# Patient Record
Sex: FEMALE | Race: WHITE | NOT HISPANIC OR LATINO | Employment: FULL TIME | URBAN - METROPOLITAN AREA
[De-identification: names, ages, dates, MRNs, and addresses within clinical notes are randomized per-mention and may not be internally consistent; named-entity substitution may affect disease eponyms.]

---

## 2018-01-18 ENCOUNTER — OFFICE VISIT (OUTPATIENT)
Dept: FAMILY MEDICINE CLINIC | Facility: CLINIC | Age: 28
End: 2018-01-18

## 2018-01-18 VITALS
HEART RATE: 71 BPM | HEIGHT: 59 IN | OXYGEN SATURATION: 98 % | WEIGHT: 253 LBS | TEMPERATURE: 98.8 F | DIASTOLIC BLOOD PRESSURE: 60 MMHG | SYSTOLIC BLOOD PRESSURE: 98 MMHG | BODY MASS INDEX: 51 KG/M2

## 2018-01-18 DIAGNOSIS — N97.9 INFERTILITY, FEMALE: ICD-10-CM

## 2018-01-18 DIAGNOSIS — Z00.00 HEALTH CARE MAINTENANCE: ICD-10-CM

## 2018-01-18 DIAGNOSIS — L70.9 ACNE, UNSPECIFIED ACNE TYPE: ICD-10-CM

## 2018-01-18 DIAGNOSIS — N93.9 ABNORMAL UTERINE BLEEDING: Primary | ICD-10-CM

## 2018-01-18 DIAGNOSIS — N92.1 MENORRHAGIA WITH IRREGULAR CYCLE: ICD-10-CM

## 2018-01-18 DIAGNOSIS — L68.9 EXCESSIVE HAIR GROWTH: ICD-10-CM

## 2018-01-18 LAB
HCT VFR BLDA CALC: 42 %
HGB BLDA-MCNC: 13.7 G/DL
MCH, POC: 27.4
MCHC, POC: 32.7
MCV, POC: 83.9
PLATELET # BLD AUTO: 317 10*3/MM3
PMV BLD: 8.1 FL
RBC, POC: 5
RDW, POC: 14
WBC # BLD: 10.5 10*3/UL

## 2018-01-18 PROCEDURE — 85027 COMPLETE CBC AUTOMATED: CPT | Performed by: FAMILY MEDICINE

## 2018-01-18 PROCEDURE — 99203 OFFICE O/P NEW LOW 30 MIN: CPT | Performed by: FAMILY MEDICINE

## 2018-01-18 NOTE — PROGRESS NOTES
Subjective   Jose Alfredo Salcedo is a 27 y.o. female. Presents today for   Chief Complaint   Patient presents with   • Establish Care     pt is here to establish care. she wants to know if she can get pregnant, having trouble conceiving.     New patient here to establish care.  Menstrual Problem   This is a chronic problem. The current episode started more than 1 year ago. The problem occurs constantly. The problem has been unchanged. Pertinent negatives include no abdominal pain, change in bowel habit, chest pain, chills, fatigue, fever, nausea, numbness, rash, sore throat, swollen glands, visual change, vomiting or weakness. Associated symptoms comments: Menarache 12 yoa;  Always has been abnormal.  Is very irregular, can skip a couple of months, then flow for 10 days.  LMP mid-December, reports this was abnormal as very light flow.  Occly heavy flow and other times spotting.  Has not had labs or evaluation, just tried OCP.    Has been trying conceive for past 12 months.   Reports partner has narrow urethra, but not certain on sperm testing or not.    Last pap smear >3 years ago;   Reports last primary was monitoring cysts on right breast.  Denies masses or lumps now;  No nipple d/c.  No skin changes or rashes.    Reports had urinary incontinence saw urology and gyn for this, was NINO;  Sent to PT and resolved with kegel exercises.    Has unwanted acne, hair loss (but thinks normal).  Has also unwanted hair growth.   No mood changes.. Nothing aggravates the symptoms. Treatments tried: Has tried OCP in past for menstrual cycle, currently trying to conceive. The treatment provided no relief.       Review of Systems   Constitutional: Negative for chills, fatigue and fever.   HENT: Negative for sore throat.    Eyes: Negative for visual disturbance.   Respiratory: Negative for shortness of breath and wheezing.    Cardiovascular: Negative for chest pain.   Gastrointestinal: Negative for abdominal pain, change in bowel habit,  "nausea and vomiting.   Genitourinary: Positive for menstrual problem.   Skin: Negative for pallor and rash.   Neurological: Negative for dizziness, syncope, weakness, light-headedness and numbness.       The following portions of the patient's history were reviewed and updated as appropriate: allergies, current medications, past family history, past medical history, past social history, past surgical history and problem list.    There is no problem list on file for this patient.      Allergies   Allergen Reactions   • Aspirin Hives and Swelling       No current outpatient prescriptions on file prior to visit.     No current facility-administered medications on file prior to visit.      Past Medical History:   Diagnosis Date   • Asthma    • Headache    • Kidney stone    • Low back pain      History reviewed. No pertinent surgical history.  Family History   Problem Relation Age of Onset   • Diabetes Mother    • Heart disease Mother    • Hypertension Mother    • Bipolar disorder Mother    • Diabetes Maternal Grandmother      Social History     Social History   • Marital status:      Spouse name: N/A   • Number of children: N/A   • Years of education: N/A     Social History Main Topics   • Smoking status: Never Smoker   • Smokeless tobacco: Never Used   • Alcohol use No   • Drug use: No   • Sexual activity: Not Asked     Other Topics Concern   • None     Social History Narrative   • None     OB History      Para Term  AB Living    0 0 0 0 0 0    SAB TAB Ectopic Multiple Live Births    0 0 0 0 0        Obstetric Comments    Would like to conceive          Objective   Vitals:    18 0927   BP: 98/60   BP Location: Left arm   Patient Position: Sitting   Cuff Size: Large Adult   Pulse: 71   Temp: 98.8 °F (37.1 °C)   TempSrc: Oral   SpO2: 98%   Weight: 115 kg (253 lb)   Height: 149.9 cm (59\")       Physical Exam   Constitutional: She appears well-developed and well-nourished.   HENT:   Head: " Normocephalic and atraumatic.   Neck: Neck supple. No JVD present. No thyromegaly present.   Cardiovascular: Normal rate, regular rhythm and normal heart sounds.  Exam reveals no gallop and no friction rub.    No murmur heard.  Pulmonary/Chest: Effort normal and breath sounds normal. No respiratory distress. She has no wheezes. She has no rales.   Abdominal: Soft. Bowel sounds are normal. She exhibits no distension. There is no tenderness. There is no rebound and no guarding.   Musculoskeletal: She exhibits no edema.   Neurological: She is alert.   Skin: Skin is warm and dry.   Facial acne mild noted   Psychiatric: She has a normal mood and affect. Her behavior is normal.   Nursing note and vitals reviewed.    CBC ordered and reviewed.     Ref Range & Units 6d ago     Hematocrit % 42   Hemoglobin g/dL 13.7   RBC  5   WBC  10.5   MCV  83.9   MCH  27.4   MCHC  32.7   RDW-CV  14   Platelets 10*3/mm3 317   MPV  8.1   Resulting Agency  [facility]      Specimen Collected: 01/18/18 11:34 AM   Last Resulted: 01/18/18 11:34 AM          Assessment/Plan   Jose Alfredo was seen today for establish care.    Diagnoses and all orders for this visit:    Abnormal uterine bleeding  -     Comprehensive Metabolic Panel  -     DHEA-Sulfate  -     POC CBC  -     Testosterone, Free, Total  -     TSH  -     FSH & LH  -     Insulin, Total  -     Lipid Panel  -     US pelvis complete  -     Ambulatory Referral to Gynecology    Menorrhagia with irregular cycle  -     Comprehensive Metabolic Panel  -     DHEA-Sulfate  -     POC CBC  -     Testosterone, Free, Total  -     TSH  -     FSH & LH  -     Insulin, Total  -     Lipid Panel  -     US pelvis complete  -     Ambulatory Referral to Gynecology    Infertility, female  -     Comprehensive Metabolic Panel  -     DHEA-Sulfate  -     POC CBC  -     Testosterone, Free, Total  -     TSH  -     FSH & LH  -     Insulin, Total  -     Lipid Panel  -     US pelvis complete  -      Ambulatory Referral to Gynecology    Health care maintenance  -     Comprehensive Metabolic Panel  -     DHEA-Sulfate  -     POC CBC  -     Testosterone, Free, Total  -     TSH  -     FSH & LH  -     Insulin, Total  -     Lipid Panel  -     US pelvis complete  -     Ambulatory Referral to Gynecology    -patient has been unable to conceive over past 12 months;  Will check labs;  Also lifetime irregular periods with ? Anovulatory bleeding.   I discussed with PCOS as possible reason.  Will check labs, u/s and refer to gynecology.  -recommend up date pap, will see gyn  -d/w pcos basic tx options if dx, will await work-up  -encouraged come with partners appt to discuss testing for him as well         -Follow up: 2 months     No current outpatient prescriptions on file.

## 2018-01-21 LAB
ALBUMIN SERPL-MCNC: 4.3 G/DL (ref 3.5–5.2)
ALBUMIN/GLOB SERPL: 1.1 G/DL
ALP SERPL-CCNC: 84 U/L (ref 39–117)
ALT SERPL-CCNC: 23 U/L (ref 1–33)
AST SERPL-CCNC: 21 U/L (ref 1–32)
BILIRUB SERPL-MCNC: 0.4 MG/DL (ref 0.1–1.2)
BUN SERPL-MCNC: 10 MG/DL (ref 6–20)
BUN/CREAT SERPL: 11.1 (ref 7–25)
CALCIUM SERPL-MCNC: 9.8 MG/DL (ref 8.6–10.5)
CHLORIDE SERPL-SCNC: 101 MMOL/L (ref 98–107)
CHOLEST SERPL-MCNC: 178 MG/DL (ref 0–200)
CO2 SERPL-SCNC: 20.8 MMOL/L (ref 22–29)
CREAT SERPL-MCNC: 0.9 MG/DL (ref 0.57–1)
DHEA-S SERPL-MCNC: 322 UG/DL (ref 84.8–378)
FSH SERPL-ACNC: 1.9 MIU/ML
GLOBULIN SER CALC-MCNC: 3.8 GM/DL
GLUCOSE SERPL-MCNC: 73 MG/DL (ref 65–99)
HDLC SERPL-MCNC: 37 MG/DL (ref 40–60)
INSULIN SERPL-ACNC: 12.8 UIU/ML (ref 2.6–24.9)
LDLC SERPL CALC-MCNC: 125 MG/DL (ref 0–100)
LH SERPL-ACNC: 6.7 MIU/ML
POTASSIUM SERPL-SCNC: 4.4 MMOL/L (ref 3.5–5.2)
PROT SERPL-MCNC: 8.1 G/DL (ref 6–8.5)
SODIUM SERPL-SCNC: 139 MMOL/L (ref 136–145)
TESTOST FREE SERPL-MCNC: 3 PG/ML (ref 0–4.2)
TESTOST SERPL-MCNC: 51 NG/DL (ref 8–48)
TRIGL SERPL-MCNC: 81 MG/DL (ref 0–150)
TSH SERPL DL<=0.005 MIU/L-ACNC: 2.15 MIU/ML (ref 0.27–4.2)
VLDLC SERPL CALC-MCNC: 16.2 MG/DL (ref 5–40)

## 2018-01-24 DIAGNOSIS — E28.2 PCOS (POLYCYSTIC OVARIAN SYNDROME): ICD-10-CM

## 2018-01-24 DIAGNOSIS — E28.8 HYPERANDROGENISM: ICD-10-CM

## 2018-01-24 DIAGNOSIS — L68.0 FAMILIAL HIRSUTISM: Primary | ICD-10-CM

## 2018-01-24 NOTE — PROGRESS NOTES
Call and mail copy of results to patient.  Insulin level and blood sugar is normal  Thyroid okay  Lipids mildly elevated  Testosterone mildly elevated, wonder if PCOS.  Since patient desires fertility, I recommend we send to endocrinology.  I placed referral.  I also recommend weight loss, it has been shown 5 to 10% of loss of current body weight can improve fertility if does have pcos.

## 2018-02-17 ENCOUNTER — APPOINTMENT (OUTPATIENT)
Dept: ULTRASOUND IMAGING | Facility: HOSPITAL | Age: 28
End: 2018-02-17

## 2019-03-27 ENCOUNTER — OFFICE VISIT (OUTPATIENT)
Dept: FAMILY MEDICINE CLINIC | Facility: CLINIC | Age: 29
End: 2019-03-27

## 2019-03-27 VITALS
HEIGHT: 62 IN | OXYGEN SATURATION: 96 % | TEMPERATURE: 98.1 F | HEART RATE: 93 BPM | BODY MASS INDEX: 51.34 KG/M2 | WEIGHT: 279 LBS | SYSTOLIC BLOOD PRESSURE: 112 MMHG | DIASTOLIC BLOOD PRESSURE: 60 MMHG

## 2019-03-27 DIAGNOSIS — J45.990 ASTHMA, EXERCISE INDUCED: ICD-10-CM

## 2019-03-27 DIAGNOSIS — K21.00 GASTROESOPHAGEAL REFLUX DISEASE WITH ESOPHAGITIS: Primary | ICD-10-CM

## 2019-03-27 DIAGNOSIS — K52.9 GASTROENTERITIS: ICD-10-CM

## 2019-03-27 PROBLEM — J45.22 MILD INTERMITTENT ASTHMA WITH STATUS ASTHMATICUS: Status: ACTIVE | Noted: 2019-03-27

## 2019-03-27 PROCEDURE — 99214 OFFICE O/P EST MOD 30 MIN: CPT | Performed by: NURSE PRACTITIONER

## 2019-03-27 RX ORDER — OMEPRAZOLE 20 MG/1
20 CAPSULE, DELAYED RELEASE ORAL DAILY
Qty: 30 CAPSULE | Refills: 1 | Status: SHIPPED | OUTPATIENT
Start: 2019-03-27 | End: 2019-04-26

## 2019-03-27 RX ORDER — ALBUTEROL SULFATE 90 UG/1
2 AEROSOL, METERED RESPIRATORY (INHALATION) EVERY 4 HOURS PRN
Qty: 6.7 G | Refills: 11 | Status: SHIPPED | OUTPATIENT
Start: 2019-03-27 | End: 2020-01-29 | Stop reason: ALTCHOICE

## 2019-03-27 NOTE — ASSESSMENT & PLAN NOTE
Asthma is mores symptomatic .  The patient is experiencing mostly exercise induced . She is experiencing no nighttime asthma symptoms.  Personalized, written asthma management plan given.  Asthma information handout given.  Discussed monitoring symptoms and use of quick-relief medications and contacting us early in the course of exacerbations.  Warning signs of respiratory distress were reviewed with the patient.   Reduce exposure to inhaled allergens: wash bedding weekly in water > 130'F to kill dust mites, vacuum 2x/week (the patient should not do the vacuuming), keep pets out of bedroom with bedroom door closed, keep pets off furniture and wash pet weekly.

## 2019-03-27 NOTE — PROGRESS NOTES
[facility]     Jose Alfredo GLASS is a 29 y.o. female who presents with   Chief Complaint   Patient presents with   • Abdominal Pain     burning   • Diarrhea   • Vomiting   • Asthma     wants inhaler       After eating alot of acidic foods this weekend patient experienced some abdominal pain,gerd, nausea, vomiting and diarrhea. Has exspereinced Gerd in he past but not to this extreme. Abdominal pain still present nausea, vomiting and diarrhea are all resolved.  LMP: unknown  GERD: worsening   Asthma : worsening. Has not had any acute exacerbation of Asthma. But has noticed shortness of breath while walking up stairs or any physical activity.      Abdominal Pain   This is a new problem. The current episode started yesterday. The onset quality is sudden. The problem occurs constantly. The problem has been gradually improving. The pain is located in the epigastric region. The quality of the pain is burning. The abdominal pain radiates to the epigastric region. Associated symptoms include diarrhea, nausea and vomiting. Pertinent negatives include no fever. She has tried nothing for the symptoms.   Diarrhea    This is a new problem. The current episode started yesterday. The problem occurs less than 2 times per day. The problem has been resolved. Associated symptoms include abdominal pain and vomiting. Pertinent negatives include no coughing or fever. She has tried nothing for the symptoms.   Vomiting    This is a new problem. The problem occurs less than 2 times per day. The problem has been unchanged. The emesis has an appearance of stomach contents. There has been no fever. Associated symptoms include abdominal pain and diarrhea. Pertinent negatives include no coughing or fever. She has tried nothing for the symptoms. The treatment provided no relief.        Review of Systems   Constitutional: Negative for fever.   Respiratory: Negative for cough.    Gastrointestinal: Positive for abdominal pain, diarrhea, nausea and  "vomiting.   All other systems reviewed and are negative.        The following portions of the patient's history were reviewed and updated as appropriate: allergies, current medications, past family history, past medical history, past social history, past surgical history and problem list.      Patient Active Problem List    Diagnosis Date Noted   • Mild intermittent asthma with status asthmaticus 03/27/2019       No current outpatient medications on file prior to visit.     No current facility-administered medications on file prior to visit.        Objective     /60 (BP Location: Left arm, Patient Position: Sitting, Cuff Size: Large Adult)   Pulse 93   Temp 98.1 °F (36.7 °C) (Oral)   Ht 156.2 cm (61.5\")   Wt 127 kg (279 lb)   LMP 02/13/2019 (Approximate)   SpO2 96%   BMI 51.86 kg/m²     Physical Exam   Constitutional: She is oriented to person, place, and time. Vital signs are normal. She appears well-developed and well-nourished.   Eyes: Conjunctivae are normal. Pupils are equal, round, and reactive to light.   Cardiovascular: Normal rate and regular rhythm.   Pulmonary/Chest: Effort normal and breath sounds normal.   Abdominal: Soft. Normal appearance and bowel sounds are normal. She exhibits no distension. There is tenderness in the epigastric area. There is no rebound and no guarding. No hernia.   Musculoskeletal: Normal range of motion. She exhibits no edema.   Neurological: She is alert and oriented to person, place, and time.   Skin: Skin is warm. No erythema.   Vitals reviewed.      Procedures    Assessment/Plan   Jose Alfredo was seen today for abdominal pain, diarrhea, vomiting and asthma.    Diagnoses and all orders for this visit:    Gastroesophageal reflux disease with esophagitis  -     omeprazole (PRILOSEC) 20 MG capsule; Take 1 capsule by mouth Daily for 30 days.    Gastroenteritis    Asthma, exercise induced  -     albuterol sulfate  (90 Base) MCG/ACT inhaler; Inhale 2 puffs Every 4 " (Four) Hours As Needed for Wheezing.        Discussion   BRAT diet and advance as tolerated.  Increase fluid intake to prevent dehydration.        No future appointments.

## 2019-11-14 ENCOUNTER — OFFICE VISIT (OUTPATIENT)
Dept: FAMILY MEDICINE CLINIC | Facility: CLINIC | Age: 29
End: 2019-11-14

## 2019-11-14 VITALS
HEIGHT: 62 IN | OXYGEN SATURATION: 98 % | WEIGHT: 281 LBS | TEMPERATURE: 99.3 F | SYSTOLIC BLOOD PRESSURE: 126 MMHG | HEART RATE: 95 BPM | DIASTOLIC BLOOD PRESSURE: 70 MMHG | BODY MASS INDEX: 51.71 KG/M2

## 2019-11-14 DIAGNOSIS — Z87.898 HISTORY OF PREDIABETES: ICD-10-CM

## 2019-11-14 DIAGNOSIS — Z00.00 ANNUAL VISIT FOR GENERAL ADULT MEDICAL EXAMINATION WITHOUT ABNORMAL FINDINGS: ICD-10-CM

## 2019-11-14 DIAGNOSIS — K59.00 CONSTIPATION, UNSPECIFIED CONSTIPATION TYPE: Primary | ICD-10-CM

## 2019-11-14 PROCEDURE — 99213 OFFICE O/P EST LOW 20 MIN: CPT | Performed by: NURSE PRACTITIONER

## 2019-11-14 RX ORDER — POLYETHYLENE GLYCOL 3350 17 G/17G
17 POWDER, FOR SOLUTION ORAL DAILY
Qty: 20 EACH | Refills: 1 | OUTPATIENT
Start: 2019-11-14 | End: 2021-01-07

## 2019-11-14 NOTE — PROGRESS NOTES
"Tala GLASS is a 29 y.o. female who presents with   Chief Complaint   Patient presents with   • Diabetes   • Annual Exam   • Flank Pain     LEFT SIDE PAIN WHEN EATING.        Wants to be checked for DM patient has been on metformin as a child before. Patient was on it as precaution for family history.   Annual exam :   Flank pain : meals and feeling full eating or drinking and then pain last a few hours unless I go to bathroom feel bloated . History of constipation and has to do miralax        Diabetes   She presents for her initial diabetic visit. There are no hypoglycemic associated symptoms. Associated symptoms include polydipsia. Pertinent negatives for diabetes include no polyphagia and no polyuria.   Flank Pain   The current episode started more than 1 month ago (2 month ). The problem occurs intermittently. The problem has been waxing and waning since onset. The pain does not radiate. Exacerbated by: eating  The treatment provided no relief.        Review of Systems   Endocrine: Positive for polydipsia. Negative for polyphagia and polyuria.   Genitourinary: Positive for flank pain.         The following portions of the patient's history were reviewed and updated as appropriate: allergies, current medications, past family history, past medical history, past social history, past surgical history and problem list.      Patient Active Problem List    Diagnosis Date Noted   • Mild intermittent asthma with status asthmaticus 03/27/2019       Current Outpatient Medications on File Prior to Visit   Medication Sig Dispense Refill   • albuterol sulfate  (90 Base) MCG/ACT inhaler Inhale 2 puffs Every 4 (Four) Hours As Needed for Wheezing. 6.7 g 11     No current facility-administered medications on file prior to visit.        Objective     /70 (BP Location: Right arm, Patient Position: Sitting, Cuff Size: Adult)   Pulse 95   Temp 99.3 °F (37.4 °C) (Oral)   Ht 156.2 cm (61.5\")   Wt 127 kg " (281 lb)   LMP 10/21/2019   SpO2 98%   BMI 52.23 kg/m²     Physical Exam   Constitutional: She appears well-developed and well-nourished.   Eyes: Conjunctivae are normal. Pupils are equal, round, and reactive to light.   Neck: Normal range of motion.   Cardiovascular: Normal rate, regular rhythm and normal heart sounds.   Pulmonary/Chest: Effort normal and breath sounds normal. No respiratory distress.   Abdominal: Bowel sounds are normal. She exhibits no distension and no mass. There is no tenderness. There is no rebound and no guarding.   Skin: Skin is warm and dry.   Psychiatric: She has a normal mood and affect.   Vitals reviewed.      Procedures    Assessment/Plan   Jose Alfredo was seen today for diabetes, annual exam and flank pain.    Diagnoses and all orders for this visit:    Constipation, unspecified constipation type  -     polyethylene glycol (MIRALAX) packet; Take 17 g by mouth Daily.    History of prediabetes  -     Hemoglobin A1c  -     CBC & Differential  -     Protime-INR  -     Hemoglobin A1c    Annual visit for general adult medical examination without abnormal findings  -     CBC & Differential  -     Comprehensive metabolic panel  -     Lipid Panel  -     CBC & Differential    Other orders  -     Specimen Status Report  -     Request Problem      Follow up in 2 wks   Discussion  DM2 : history of prediabetes on Metformin as a child. Wants to be retested for DM2    constipation: Follow up in 2 wks      Future Appointments   Date Time Provider Department Center   11/27/2019  3:45 PM Deepa Garcia APRN [facility] None

## 2019-11-15 LAB
ALBUMIN SERPL-MCNC: 4.2 G/DL (ref 3.5–5.5)
ALBUMIN/GLOB SERPL: 1.2 {RATIO} (ref 1.2–2.2)
ALP SERPL-CCNC: 92 IU/L (ref 39–117)
ALT SERPL-CCNC: 31 IU/L (ref 0–32)
AST SERPL-CCNC: 26 IU/L (ref 0–40)
BASOPHILS # BLD AUTO: (no result) 10*3/UL
BILIRUB SERPL-MCNC: 0.2 MG/DL (ref 0–1.2)
BUN SERPL-MCNC: 12 MG/DL (ref 6–20)
BUN/CREAT SERPL: 14 (ref 9–23)
CALCIUM SERPL-MCNC: 9.7 MG/DL (ref 8.7–10.2)
CHLORIDE SERPL-SCNC: 105 MMOL/L (ref 96–106)
CHOLEST SERPL-MCNC: 170 MG/DL (ref 100–199)
CO2 SERPL-SCNC: 16 MMOL/L (ref 20–29)
CREAT SERPL-MCNC: 0.83 MG/DL (ref 0.57–1)
EOSINOPHIL # BLD AUTO: (no result) 10*3/UL
EOSINOPHIL NFR BLD AUTO: (no result) %
GLOBULIN SER CALC-MCNC: 3.5 G/DL (ref 1.5–4.5)
GLUCOSE SERPL-MCNC: 83 MG/DL (ref 65–99)
HBA1C MFR BLD: NORMAL %
HCT VFR BLD AUTO: (no result) %
HDLC SERPL-MCNC: 35 MG/DL
HGB BLD-MCNC: (no result) G/DL
LDLC SERPL CALC-MCNC: 114 MG/DL (ref 0–99)
LYMPHOCYTES # BLD AUTO: (no result) 10*3/UL
LYMPHOCYTES NFR BLD AUTO: (no result) %
MONOCYTES NFR BLD AUTO: (no result) %
NEUTROPHILS NFR BLD AUTO: (no result) %
PLATELET # BLD AUTO: (no result) 10*3/UL
POTASSIUM SERPL-SCNC: 4.8 MMOL/L (ref 3.5–5.2)
PROT SERPL-MCNC: 7.7 G/DL (ref 6–8.5)
RBC # BLD AUTO: (no result) 10*6/UL
REQUEST PROBLEM: NORMAL
SODIUM SERPL-SCNC: 138 MMOL/L (ref 134–144)
SPECIMEN STATUS: NORMAL
TRIGL SERPL-MCNC: 104 MG/DL (ref 0–149)
VLDLC SERPL CALC-MCNC: 21 MG/DL (ref 5–40)
WBC # BLD AUTO: (no result) X10E3/UL

## 2019-11-18 DIAGNOSIS — Z00.00 ANNUAL PHYSICAL EXAM: Primary | ICD-10-CM

## 2019-11-22 LAB
BASOPHILS # BLD AUTO: 0.05 10*3/MM3 (ref 0–0.2)
BASOPHILS NFR BLD AUTO: 0.6 % (ref 0–1.5)
EOSINOPHIL # BLD AUTO: 0.13 10*3/MM3 (ref 0–0.4)
EOSINOPHIL NFR BLD AUTO: 1.6 % (ref 0.3–6.2)
ERYTHROCYTE [DISTWIDTH] IN BLOOD BY AUTOMATED COUNT: 13.7 % (ref 12.3–15.4)
HBA1C MFR BLD: 5.8 % (ref 4.8–5.6)
HCT VFR BLD AUTO: 42.6 % (ref 34–46.6)
HGB BLD-MCNC: 13.6 G/DL (ref 12–15.9)
IMM GRANULOCYTES # BLD AUTO: 0.02 10*3/MM3 (ref 0–0.05)
IMM GRANULOCYTES NFR BLD AUTO: 0.3 % (ref 0–0.5)
INR PPP: 1.05 (ref 0.9–1.1)
LYMPHOCYTES # BLD AUTO: 2.11 10*3/MM3 (ref 0.7–3.1)
LYMPHOCYTES NFR BLD AUTO: 26.6 % (ref 19.6–45.3)
MCH RBC QN AUTO: 26.4 PG (ref 26.6–33)
MCHC RBC AUTO-ENTMCNC: 31.9 G/DL (ref 31.5–35.7)
MCV RBC AUTO: 82.7 FL (ref 79–97)
MONOCYTES # BLD AUTO: 0.65 10*3/MM3 (ref 0.1–0.9)
MONOCYTES NFR BLD AUTO: 8.2 % (ref 5–12)
NEUTROPHILS # BLD AUTO: 4.98 10*3/MM3 (ref 1.7–7)
NEUTROPHILS NFR BLD AUTO: 62.7 % (ref 42.7–76)
NRBC BLD AUTO-RTO: 0 /100 WBC (ref 0–0.2)
PLATELET # BLD AUTO: 314 10*3/MM3 (ref 140–450)
PROTHROMBIN TIME: 13.4 SECONDS (ref 11.7–14.2)
RBC # BLD AUTO: 5.15 10*6/MM3 (ref 3.77–5.28)
WBC # BLD AUTO: 7.94 10*3/MM3 (ref 3.4–10.8)

## 2019-11-23 ENCOUNTER — RESULTS ENCOUNTER (OUTPATIENT)
Dept: FAMILY MEDICINE CLINIC | Facility: CLINIC | Age: 29
End: 2019-11-23

## 2019-11-23 DIAGNOSIS — Z00.00 ANNUAL PHYSICAL EXAM: ICD-10-CM

## 2019-11-27 ENCOUNTER — OFFICE VISIT (OUTPATIENT)
Dept: FAMILY MEDICINE CLINIC | Facility: CLINIC | Age: 29
End: 2019-11-27

## 2019-11-27 VITALS
BODY MASS INDEX: 50.79 KG/M2 | HEART RATE: 82 BPM | DIASTOLIC BLOOD PRESSURE: 82 MMHG | HEIGHT: 62 IN | WEIGHT: 276 LBS | SYSTOLIC BLOOD PRESSURE: 130 MMHG | TEMPERATURE: 98.1 F | OXYGEN SATURATION: 98 %

## 2019-11-27 DIAGNOSIS — K59.00 CONSTIPATION, UNSPECIFIED CONSTIPATION TYPE: ICD-10-CM

## 2019-11-27 DIAGNOSIS — Z30.09 FAMILY PLANNING: Primary | ICD-10-CM

## 2019-11-27 DIAGNOSIS — R10.12 LEFT UPPER QUADRANT PAIN: ICD-10-CM

## 2019-11-27 DIAGNOSIS — N92.6 MENSTRUAL ABNORMALITY: ICD-10-CM

## 2019-11-27 PROCEDURE — 99214 OFFICE O/P EST MOD 30 MIN: CPT | Performed by: NURSE PRACTITIONER

## 2019-12-12 ENCOUNTER — HOSPITAL ENCOUNTER (OUTPATIENT)
Dept: ULTRASOUND IMAGING | Facility: HOSPITAL | Age: 29
Discharge: HOME | End: 2019-12-12
Admitting: NURSE PRACTITIONER

## 2019-12-12 PROCEDURE — 76700 US EXAM ABDOM COMPLETE: CPT

## 2020-01-29 ENCOUNTER — OFFICE VISIT (OUTPATIENT)
Dept: FAMILY MEDICINE CLINIC | Facility: CLINIC | Age: 30
End: 2020-01-29

## 2020-01-29 VITALS
WEIGHT: 273 LBS | DIASTOLIC BLOOD PRESSURE: 70 MMHG | HEART RATE: 123 BPM | HEIGHT: 62 IN | BODY MASS INDEX: 50.24 KG/M2 | OXYGEN SATURATION: 99 % | TEMPERATURE: 98.3 F | SYSTOLIC BLOOD PRESSURE: 120 MMHG

## 2020-01-29 DIAGNOSIS — J45.901 MILD ASTHMA EXACERBATION: ICD-10-CM

## 2020-01-29 DIAGNOSIS — J06.9 ACUTE URI: Primary | ICD-10-CM

## 2020-01-29 PROCEDURE — 99214 OFFICE O/P EST MOD 30 MIN: CPT | Performed by: NURSE PRACTITIONER

## 2020-01-29 RX ORDER — METHYLPREDNISOLONE 4 MG/1
TABLET ORAL
Qty: 21 TABLET | Refills: 0 | OUTPATIENT
Start: 2020-01-29 | End: 2021-01-07

## 2020-01-29 RX ORDER — BENZONATATE 100 MG/1
100 CAPSULE ORAL 3 TIMES DAILY PRN
Qty: 30 CAPSULE | Refills: 0 | OUTPATIENT
Start: 2020-01-29 | End: 2021-01-07

## 2020-01-29 RX ORDER — ALBUTEROL SULFATE 90 UG/1
2 AEROSOL, METERED RESPIRATORY (INHALATION) EVERY 4 HOURS PRN
Qty: 8 G | Refills: 11 | Status: SHIPPED | OUTPATIENT
Start: 2020-01-29 | End: 2022-01-03 | Stop reason: SDUPTHER

## 2020-01-29 NOTE — PROGRESS NOTES
Tala Aaron is a 30 y.o. female who presents with   Chief Complaint   Patient presents with   • Cough   • Sore Throat   • Fatigue   • Chills       Sweats at night exhausted and weak cough productive sore throat yesterday morning used cough drops but throat hurt more   Feel a little short of breath and feels a slight heaviness in the chest started yesterday about 2-3 days ago     Cough   Associated symptoms include chills, a sore throat and shortness of breath. Pertinent negatives include no fever or headaches.   Sore Throat    Associated symptoms include coughing and shortness of breath. Pertinent negatives include no headaches.   Fatigue   Associated symptoms include chills, coughing, fatigue and a sore throat. Pertinent negatives include no fever or headaches.   Chills   Associated symptoms include chills, coughing, fatigue and a sore throat. Pertinent negatives include no fever or headaches.        Review of Systems   Constitutional: Positive for chills and fatigue. Negative for fever.   HENT: Positive for sore throat.    Respiratory: Positive for cough and shortness of breath.    Neurological: Negative for headaches.   All other systems reviewed and are negative.        The following portions of the patient's history were reviewed and updated as appropriate: allergies, current medications, past family history, past medical history, past social history, past surgical history and problem list.      Patient Active Problem List    Diagnosis Date Noted   • Mild intermittent asthma with status asthmaticus 03/27/2019       Current Outpatient Medications on File Prior to Visit   Medication Sig Dispense Refill   • polyethylene glycol (MIRALAX) packet Take 17 g by mouth Daily. 20 each 1     No current facility-administered medications on file prior to visit.        Objective     /70 (BP Location: Right arm, Patient Position: Sitting, Cuff Size: Adult)   Pulse (!) 123   Temp 98.3 °F (36.8 °C)  "(Oral)   Ht 156.2 cm (61.5\")   Wt 124 kg (273 lb)   SpO2 99%   BMI 50.75 kg/m²     Physical Exam   Constitutional: She appears well-developed and well-nourished.   Eyes: Pupils are equal, round, and reactive to light. Conjunctivae are normal.   Neck: Normal range of motion.   Cardiovascular: Normal rate, regular rhythm and normal heart sounds.   Pulmonary/Chest: Effort normal. No respiratory distress. She has wheezes (slight wheeze ).   Abdominal: Bowel sounds are normal. She exhibits no distension and no mass. There is no tenderness. There is no rebound and no guarding.   Skin: Skin is warm and dry.   Psychiatric: She has a normal mood and affect.       Procedures    Assessment/Plan   Jose Alfredo was seen today for cough, sore throat, fatigue and chills.    Diagnoses and all orders for this visit:    Acute URI  -     benzonatate (TESSALON PERLES) 100 MG capsule; Take 1 capsule by mouth 3 (Three) Times a Day As Needed for Cough.  -     albuterol sulfate HFA (PROAIR HFA) 108 (90 Base) MCG/ACT inhaler; Inhale 2 puffs Every 4 (Four) Hours As Needed for Wheezing.    Mild asthma exacerbation  -     methylPREDNISolone (MEDROL) 4 MG tablet; follow package directions  -     albuterol sulfate HFA (PROAIR HFA) 108 (90 Base) MCG/ACT inhaler; Inhale 2 puffs Every 4 (Four) Hours As Needed for Wheezing.  -     fluticasone-salmeterol (ADVAIR HFA) 115-21 MCG/ACT inhaler; Inhale 2 puffs 2 (Two) Times a Day.      Follow up in 1-2 wks if symptoms don't improve  Discussion         Future Appointments   Date Time Provider Department Center   2/12/2020  1:50 PM Prateek Ni MD [facility]         "

## 2020-02-12 ENCOUNTER — OFFICE VISIT (OUTPATIENT)
Dept: OBSTETRICS AND GYNECOLOGY | Facility: CLINIC | Age: 30
End: 2020-02-12

## 2020-02-12 VITALS
BODY MASS INDEX: 50.24 KG/M2 | DIASTOLIC BLOOD PRESSURE: 80 MMHG | HEIGHT: 62 IN | WEIGHT: 273 LBS | SYSTOLIC BLOOD PRESSURE: 118 MMHG

## 2020-02-12 DIAGNOSIS — N92.6 IRREGULAR MENSES: ICD-10-CM

## 2020-02-12 DIAGNOSIS — E66.01 MORBID OBESITY WITH BMI OF 50.0-59.9, ADULT (HCC): ICD-10-CM

## 2020-02-12 DIAGNOSIS — Z01.411 ENCOUNTER FOR GYNECOLOGICAL EXAMINATION WITH ABNORMAL FINDING: Primary | ICD-10-CM

## 2020-02-12 PROCEDURE — 99212 OFFICE O/P EST SF 10 MIN: CPT | Performed by: OBSTETRICS & GYNECOLOGY

## 2020-02-12 PROCEDURE — 99385 PREV VISIT NEW AGE 18-39: CPT | Performed by: OBSTETRICS & GYNECOLOGY

## 2020-02-12 NOTE — PROGRESS NOTES
Subjective    Chief Complaint   Patient presents with   • Gynecologic Exam     AE, DISCUSS FERTILITY, IRREGULAR PERIODS      History of Present Illness    Jose Alfredo Aaron is a 30 y.o. female who presents for annual exam.  Non-smoker.  Patient and her  are contemplating getting pregnant.  Her  was  before and was told he had a low sperm count due to a narrow urethra by reportedly a urologist.  He has never had any children the patient herself has never been pregnant does have irregular periods which occasionally are heavy and she does skip a lot.  She does not have any recent TFTs.  We discussed semen analysis with probable referral to urologist and BBT charts which she will perform.  I am giving her a name of a urologist specializing in male infertility because I feel that her  most likely needs to go to one.      Obstetric History:  OB History        0    Para   0    Term   0       0    AB   0    Living   0       SAB   0    TAB   0    Ectopic   0    Molar   0    Multiple   0    Live Births   0          Obstetric Comments   Would like to conceive            Menstrual History:     Patient's last menstrual period was 2020.       Past Medical History:   Diagnosis Date   • Asthma    • Headache    • Kidney stone    • Low back pain      Family History   Problem Relation Age of Onset   • Diabetes Mother    • Heart disease Mother    • Hypertension Mother    • Bipolar disorder Mother    • Diabetes Maternal Grandmother      Social History     Tobacco Use   Smoking Status Never Smoker   Smokeless Tobacco Never Used         The following portions of the patient's history were reviewed and updated as appropriate: allergies, current medications, past family history, past medical history, past social history, past surgical history and problem list.    Review of Systems   Constitutional: Positive for unexpected weight change. Negative for fever.   HENT: Negative.    Respiratory:  "Negative for shortness of breath and wheezing.    Cardiovascular: Negative for chest pain, palpitations and leg swelling.   Gastrointestinal: Negative for abdominal pain, anal bleeding and blood in stool.   Genitourinary: Positive for menstrual problem. Negative for dysuria, pelvic pain, urgency, vaginal bleeding, vaginal discharge and vaginal pain.   Skin: Negative.    Neurological: Negative.    Hematological: Negative.  Negative for adenopathy.   Psychiatric/Behavioral: Negative.  Negative for dysphoric mood. The patient is not nervous/anxious.             Objective   Physical Exam   Constitutional: She is oriented to person, place, and time. Vital signs are normal. She appears well-developed and well-nourished.   HENT:   Head: Normocephalic.   Neck: Trachea normal. No tracheal deviation present. No thyromegaly present.   Cardiovascular: Normal rate, regular rhythm and normal heart sounds.   No murmur heard.  Pulmonary/Chest: Effort normal and breath sounds normal.   Breasts without masses, tenderness or nipple discharge   Abdominal: Soft. Normal appearance. She exhibits no mass. There is no hepatosplenomegaly. There is no tenderness. No hernia.   Genitourinary: Rectum normal, vagina normal and uterus normal. Uterus is not enlarged and not tender. Cervix exhibits no motion tenderness. Right adnexum displays no mass and no tenderness. Left adnexum displays no mass and no tenderness. No vaginal discharge found.   Genitourinary Comments: External genitalia normal    Lymphadenopathy:     She has no cervical adenopathy.     She has no axillary adenopathy.   Neurological: She is alert and oriented to person, place, and time.   Skin: Skin is warm and dry. No rash noted.   Psychiatric: She has a normal mood and affect. Her behavior is normal. Cognition and memory are normal.       /80   Ht 156.2 cm (61.5\")   Wt 124 kg (273 lb)   LMP 01/29/2020   BMI 50.75 kg/m²     Assessment/Plan   Jose Alfredo was seen today for " gynecologic exam.    Diagnoses and all orders for this visit:    Encounter for gynecological examination with abnormal finding  -     IGP,rfx Aptima HPV All Pth    Irregular menses  -     Thyroid Panel With TSH  -     Testosterone, Free, Total  -     Prolactin    Morbid obesity with BMI of 50.0-59.9, adult (CMS/[facility])  -     Thyroid Panel With TSH        Pelvic ultrasound.  Will do semen analysis and BBT charting and referral to urologist for male infertility given.    Counseled about diet and exercise.     An additional 10 minutes was spent face-to-face counseling concerning evaluation for probable infertility on both sides.  We discussed looking at her ovulation although we will not do Clomid or any other treatment or further analysis until we can get her  checked out.  I will get a pelvic ultrasound to check her ovaries and the lining of her uterus.

## 2020-02-14 LAB
CONV .: NORMAL
CYTOLOGIST CVX/VAG CYTO: NORMAL
CYTOLOGY CVX/VAG DOC CYTO: NORMAL
CYTOLOGY CVX/VAG DOC THIN PREP: NORMAL
DX ICD CODE: NORMAL
FT4I SERPL CALC-MCNC: 2.1 (ref 1.2–4.9)
HIV 1 & 2 AB SER-IMP: NORMAL
OTHER STN SPEC: NORMAL
PROLACTIN SERPL-MCNC: 19.6 NG/ML (ref 4.8–23.3)
STAT OF ADQ CVX/VAG CYTO-IMP: NORMAL
T3RU NFR SERPL: 25 % (ref 24–39)
T4 SERPL-MCNC: 8.2 UG/DL (ref 4.5–12)
TESTOST FREE SERPL-MCNC: 3.9 PG/ML (ref 0–4.2)
TESTOST SERPL-MCNC: 36 NG/DL (ref 8–48)
TSH SERPL DL<=0.005 MIU/L-ACNC: 2.39 UIU/ML (ref 0.45–4.5)

## 2020-02-18 ENCOUNTER — TELEPHONE (OUTPATIENT)
Dept: OBSTETRICS AND GYNECOLOGY | Facility: CLINIC | Age: 30
End: 2020-02-18

## 2020-02-18 NOTE — TELEPHONE ENCOUNTER
----- Message from Prateek Ni MD sent at 2/17/2020  4:57 PM EST -----  Cely please tell patient that all of her labs were normal including her Pap smear thyroid tests and prolactin.  Even her testosterone levels were normal.  [facility]

## 2020-03-04 ENCOUNTER — PROCEDURE VISIT (OUTPATIENT)
Dept: OBSTETRICS AND GYNECOLOGY | Facility: CLINIC | Age: 30
End: 2020-03-04

## 2020-03-04 DIAGNOSIS — N92.6 IRREGULAR MENSES: Primary | ICD-10-CM

## 2020-03-04 DIAGNOSIS — E66.01 MORBID OBESITY WITH BMI OF 50.0-59.9, ADULT (HCC): ICD-10-CM

## 2020-03-04 PROCEDURE — 76830 TRANSVAGINAL US NON-OB: CPT | Performed by: OBSTETRICS & GYNECOLOGY

## 2020-10-21 DIAGNOSIS — J45.901 MILD ASTHMA EXACERBATION: ICD-10-CM

## 2020-10-21 RX ORDER — FLUTICASONE PROPIONATE AND SALMETEROL XINAFOATE 115; 21 UG/1; UG/1
2 AEROSOL, METERED RESPIRATORY (INHALATION)
Qty: 8 G | Refills: 2 | Status: SHIPPED | OUTPATIENT
Start: 2020-10-21 | End: 2021-12-21

## 2020-10-21 NOTE — TELEPHONE ENCOUNTER
Caller: Jose Alfredo Aaron    Relationship: Self    Best call back number: [phone]     Medication needed:   Requested Prescriptions     Pending Prescriptions Disp Refills   • fluticasone-salmeterol (Advair HFA) 115-21 MCG/ACT inhaler 8 g 2     Sig: Inhale 2 puffs 2 (Two) Times a Day.       When do you need the refill by:10/21/2020    What details did the patient provide when requesting the medication: IS TOTALLY OUT OF THIS MEDICATION AND STATES PHARM IS WAITING ON APPROVAL      Does the patient have less than a 3 day supply:  [x] Yes  [] No    What is the patient's preferred pharmacy:     [facility] [address] [address] [place] [facility] [phone] [facility] [phone] FX

## 2020-11-25 ENCOUNTER — OFFICE VISIT (OUTPATIENT)
Dept: OBSTETRICS AND GYNECOLOGY | Facility: CLINIC | Age: 30
End: 2020-11-25

## 2020-11-25 VITALS
DIASTOLIC BLOOD PRESSURE: 80 MMHG | HEIGHT: 62 IN | WEIGHT: 288 LBS | BODY MASS INDEX: 53 KG/M2 | SYSTOLIC BLOOD PRESSURE: 120 MMHG

## 2020-11-25 DIAGNOSIS — N89.8 VAGINAL DISCHARGE: Primary | ICD-10-CM

## 2020-11-25 DIAGNOSIS — N89.8 VAGINAL ITCHING: ICD-10-CM

## 2020-11-25 DIAGNOSIS — N89.8 VAGINAL CYST: ICD-10-CM

## 2020-11-25 PROCEDURE — 99213 OFFICE O/P EST LOW 20 MIN: CPT | Performed by: NURSE PRACTITIONER

## 2020-11-25 RX ORDER — FLUCONAZOLE 150 MG/1
150 TABLET ORAL DAILY
Qty: 1 TABLET | Refills: 2 | OUTPATIENT
Start: 2020-11-25 | End: 2021-01-07

## 2020-11-25 NOTE — PROGRESS NOTES
"Chief Complaint   Patient presents with   • Vaginal Pain     Pt has cyst in vaginal area.         SUBJECTIVE:     Jose Alfredo Aaron is a 30 y.o.  who presents with c/o a vaginal cyst for 2 days. She reports cyst popped last night and she also attempted to express more drainage from the cyst. She reports the cyst drained \"off white\" material \"with blood mixed in\". Cyst was very painful, pain was worsened by sitting and walking. Pain is now resolved since draining. Prior to rupturing, she reports  the cyst was approx 4cm in size. She has experienced a similar cyst once before several years ago. LMP 2020.    C/o vaginal itching for several days. She has not tried OTC treatments.    Significant amount of clear, water discharge noted on exam. When questioned about this she reports this is typical for her. She declines STD testing. She denies new partners. She has been  for several years     This is my first time meeting Jose Alfredo Aaron  She is a pt of Dr. Ni's    Past Medical History:   Diagnosis Date   • Asthma    • Headache    • Kidney stone    • Low back pain       History reviewed. No pertinent surgical history.   Social History     Tobacco Use   • Smoking status: Never Smoker   • Smokeless tobacco: Never Used   Substance Use Topics   • Alcohol use: No   • Drug use: No     OB History    Para Term  AB Living   0 0 0 0 0 0   SAB TAB Ectopic Molar Multiple Live Births   0 0 0 0 0 0   Obstetric Comments   Would like to conceive        Review of Systems   Constitutional: Negative for chills, fatigue and fever.   Gastrointestinal: Negative for abdominal distention and abdominal pain.   Genitourinary: Positive for genital sores (cyst) and vaginal discharge. Negative for dyspareunia, dysuria, menstrual problem, pelvic pain, vaginal bleeding and vaginal pain.        + vaginal itching  - vaginal odor   Musculoskeletal: Negative for gait problem.   Skin: Negative for rash. " "  Psychiatric/Behavioral: Negative for behavioral problems.       OBJECTIVE:   Vitals:    11/25/20 0915   BP: 120/80   Weight: 131 kg (288 lb)   Height: 156.2 cm (61.5\")        Physical Exam  Vitals signs and nursing note reviewed.   Constitutional:       Appearance: Normal appearance.   Neck:      Musculoskeletal: Normal range of motion.   Cardiovascular:      Rate and Rhythm: Normal rate.   Pulmonary:      Effort: Pulmonary effort is normal.   Abdominal:      General: There is no distension.      Palpations: Abdomen is soft. There is no mass.      Tenderness: There is no abdominal tenderness. There is no guarding.      Hernia: No hernia is present. There is no hernia in the left inguinal area or right inguinal area.   Genitourinary:     Exam position: Lithotomy position.      Pubic Area: No rash or pubic lice.       Labia:         Right: No rash, tenderness, lesion or injury.         Left: Lesion (inner aspect of left labia minora with pea size firm papule, no pustule, no drainage, no erythema, no edema, no open lesion noted) present. No rash, tenderness or injury.       Vagina: No signs of injury and foreign body. Vaginal discharge (copious amounts of thin clear watery discharge) present. No erythema, tenderness, bleeding, lesions or prolapsed vaginal walls.      Cervix: No cervical motion tenderness, discharge, friability, lesion, erythema, cervical bleeding or eversion.      Uterus: Not deviated, not enlarged, not fixed, not tender and no uterine prolapse.       Adnexa:         Right: No mass, tenderness or fullness.          Left: No mass, tenderness or fullness.     Musculoskeletal: Normal range of motion.   Lymphadenopathy:      Lower Body: No right inguinal adenopathy. No left inguinal adenopathy.   Skin:     General: Skin is warm and dry.   Neurological:      General: No focal deficit present.      Mental Status: She is oriented to person, place, and time.      Cranial Nerves: No cranial nerve deficit. "   Psychiatric:         Mood and Affect: Mood normal.         Behavior: Behavior normal.         Thought Content: Thought content normal.         Judgment: Judgment normal.         ASSESSMENT:   1) Vaginal cyst  2) Vaginal itching  3) Vaginal discharge    PLAN:   Left labia minor with pea size papule, suspect was abscess. Appears to be healing well. No indication for I&D or antibiotics at this time  Encouraged sitz bath 1-2 times per day, keep area clean and dry. Call with worsening symptoms or return of symptoms. Tylenol/motrin as needed for pain  NuSwab for vaginal discharge  Will treat vaginal itching empirically with diflucan while awaiting culture results    Follow up:PRN worsening symptoms or no improvement in symptoms.     Charlene Ford, WARNER  11/25/2020  09:21 EST

## 2020-12-01 LAB
A VAGINAE DNA VAG QL NAA+PROBE: ABNORMAL SCORE
BVAB2 DNA VAG QL NAA+PROBE: ABNORMAL SCORE
C ALBICANS DNA VAG QL NAA+PROBE: NEGATIVE
C GLABRATA DNA VAG QL NAA+PROBE: NEGATIVE
MEGA1 DNA VAG QL NAA+PROBE: ABNORMAL SCORE

## 2020-12-01 RX ORDER — METRONIDAZOLE 500 MG/1
500 TABLET ORAL 2 TIMES DAILY
Qty: 14 TABLET | Refills: 0 | Status: SHIPPED | OUTPATIENT
Start: 2020-12-01 | End: 2020-12-08

## 2021-01-06 ENCOUNTER — TELEPHONE (OUTPATIENT)
Dept: FAMILY MEDICINE CLINIC | Facility: CLINIC | Age: 31
End: 2021-01-06

## 2021-04-16 ENCOUNTER — BULK ORDERING (OUTPATIENT)
Dept: CASE MANAGEMENT | Facility: OTHER | Age: 31
End: 2021-04-16

## 2021-04-16 DIAGNOSIS — Z23 IMMUNIZATION DUE: ICD-10-CM

## 2021-05-31 ENCOUNTER — APPOINTMENT (OUTPATIENT)
Dept: CT IMAGING | Facility: HOSPITAL | Age: 31
End: 2021-05-31

## 2021-05-31 ENCOUNTER — HOSPITAL ENCOUNTER (EMERGENCY)
Facility: HOSPITAL | Age: 31
Discharge: HOME | End: 2021-05-31
Attending: EMERGENCY MEDICINE | Admitting: EMERGENCY MEDICINE

## 2021-05-31 VITALS
DIASTOLIC BLOOD PRESSURE: 72 MMHG | BODY MASS INDEX: 54.42 KG/M2 | OXYGEN SATURATION: 96 % | TEMPERATURE: 98.2 F | HEART RATE: 105 BPM | SYSTOLIC BLOOD PRESSURE: 138 MMHG | RESPIRATION RATE: 16 BRPM | HEIGHT: 61 IN

## 2021-05-31 DIAGNOSIS — R09.1 PLEURISY: Primary | ICD-10-CM

## 2021-05-31 DIAGNOSIS — Z87.09 HISTORY OF ASTHMA: ICD-10-CM

## 2021-05-31 LAB
ALBUMIN SERPL-MCNC: 3.9 G/DL (ref 3.5–5.2)
ALBUMIN/GLOB SERPL: 1.1 G/DL
ALP SERPL-CCNC: 94 U/L (ref 39–117)
ALT SERPL W P-5'-P-CCNC: 30 U/L (ref 1–33)
ANION GAP SERPL CALCULATED.3IONS-SCNC: 14.3 MMOL/L (ref 5–15)
AST SERPL-CCNC: 18 U/L (ref 1–32)
BASOPHILS # BLD AUTO: 0.05 10*3/MM3 (ref 0–0.2)
BASOPHILS NFR BLD AUTO: 0.4 % (ref 0–1.5)
BILIRUB SERPL-MCNC: 0.3 MG/DL (ref 0–1.2)
BUN SERPL-MCNC: 14 MG/DL (ref 6–20)
BUN/CREAT SERPL: 26.9 (ref 7–25)
CALCIUM SPEC-SCNC: 9.3 MG/DL (ref 8.6–10.5)
CHLORIDE SERPL-SCNC: 105 MMOL/L (ref 98–107)
CO2 SERPL-SCNC: 18.7 MMOL/L (ref 22–29)
CREAT SERPL-MCNC: 0.52 MG/DL (ref 0.57–1)
DEPRECATED RDW RBC AUTO: 40.4 FL (ref 37–54)
EOSINOPHIL # BLD AUTO: 0.17 10*3/MM3 (ref 0–0.4)
EOSINOPHIL NFR BLD AUTO: 1.5 % (ref 0.3–6.2)
ERYTHROCYTE [DISTWIDTH] IN BLOOD BY AUTOMATED COUNT: 13.9 % (ref 12.3–15.4)
GFR SERPL CREATININE-BSD FRML MDRD: 138 ML/MIN/1.73
GLOBULIN UR ELPH-MCNC: 3.6 GM/DL
GLUCOSE SERPL-MCNC: 120 MG/DL (ref 65–99)
HCG SERPL QL: NEGATIVE
HCT VFR BLD AUTO: 40.2 % (ref 34–46.6)
HGB BLD-MCNC: 12.9 G/DL (ref 12–15.9)
IMM GRANULOCYTES # BLD AUTO: 0.04 10*3/MM3 (ref 0–0.05)
IMM GRANULOCYTES NFR BLD AUTO: 0.3 % (ref 0–0.5)
LYMPHOCYTES # BLD AUTO: 2.47 10*3/MM3 (ref 0.7–3.1)
LYMPHOCYTES NFR BLD AUTO: 21.2 % (ref 19.6–45.3)
MCH RBC QN AUTO: 26 PG (ref 26.6–33)
MCHC RBC AUTO-ENTMCNC: 32.1 G/DL (ref 31.5–35.7)
MCV RBC AUTO: 80.9 FL (ref 79–97)
MONOCYTES # BLD AUTO: 0.97 10*3/MM3 (ref 0.1–0.9)
MONOCYTES NFR BLD AUTO: 8.3 % (ref 5–12)
NEUTROPHILS NFR BLD AUTO: 68.3 % (ref 42.7–76)
NEUTROPHILS NFR BLD AUTO: 7.95 10*3/MM3 (ref 1.7–7)
NRBC BLD AUTO-RTO: 0 /100 WBC (ref 0–0.2)
NT-PROBNP SERPL-MCNC: 13.8 PG/ML (ref 0–450)
PLATELET # BLD AUTO: 324 10*3/MM3 (ref 140–450)
PMV BLD AUTO: 9.4 FL (ref 6–12)
POTASSIUM SERPL-SCNC: 4.3 MMOL/L (ref 3.5–5.2)
PROT SERPL-MCNC: 7.5 G/DL (ref 6–8.5)
QT INTERVAL: 323 MS
RBC # BLD AUTO: 4.97 10*6/MM3 (ref 3.77–5.28)
SODIUM SERPL-SCNC: 138 MMOL/L (ref 136–145)
TROPONIN T SERPL-MCNC: <0.01 NG/ML (ref 0–0.03)
WBC # BLD AUTO: 11.65 10*3/MM3 (ref 3.4–10.8)

## 2021-05-31 PROCEDURE — 0 IOPAMIDOL PER 1 ML: Performed by: EMERGENCY MEDICINE

## 2021-05-31 PROCEDURE — 93005 ELECTROCARDIOGRAM TRACING: CPT

## 2021-05-31 PROCEDURE — 93005 ELECTROCARDIOGRAM TRACING: CPT | Performed by: EMERGENCY MEDICINE

## 2021-05-31 PROCEDURE — 83880 ASSAY OF NATRIURETIC PEPTIDE: CPT | Performed by: PHYSICIAN ASSISTANT

## 2021-05-31 PROCEDURE — 85025 COMPLETE CBC W/AUTO DIFF WBC: CPT | Performed by: PHYSICIAN ASSISTANT

## 2021-05-31 PROCEDURE — 93010 ELECTROCARDIOGRAM REPORT: CPT | Performed by: INTERNAL MEDICINE

## 2021-05-31 PROCEDURE — 84703 CHORIONIC GONADOTROPIN ASSAY: CPT | Performed by: PHYSICIAN ASSISTANT

## 2021-05-31 PROCEDURE — 71275 CT ANGIOGRAPHY CHEST: CPT

## 2021-05-31 PROCEDURE — 99284 EMERGENCY DEPT VISIT MOD MDM: CPT

## 2021-05-31 PROCEDURE — 84484 ASSAY OF TROPONIN QUANT: CPT | Performed by: PHYSICIAN ASSISTANT

## 2021-05-31 PROCEDURE — 80053 COMPREHEN METABOLIC PANEL: CPT | Performed by: PHYSICIAN ASSISTANT

## 2021-05-31 RX ORDER — PREDNISONE 20 MG/1
60 TABLET ORAL DAILY
Qty: 15 TABLET | Refills: 0 | Status: SHIPPED | OUTPATIENT
Start: 2021-05-31 | End: 2021-06-05

## 2021-05-31 RX ORDER — SODIUM CHLORIDE 0.9 % (FLUSH) 0.9 %
10 SYRINGE (ML) INJECTION AS NEEDED
Status: DISCONTINUED | OUTPATIENT
Start: 2021-05-31 | End: 2021-05-31 | Stop reason: HOSPADM

## 2021-05-31 RX ADMIN — SODIUM CHLORIDE 500 ML: 9 INJECTION, SOLUTION INTRAVENOUS at 01:27

## 2021-05-31 RX ADMIN — IOPAMIDOL 95 ML: 755 INJECTION, SOLUTION INTRAVENOUS at 02:54

## 2021-05-31 RX ADMIN — SODIUM CHLORIDE, PRESERVATIVE FREE 10 ML: 5 INJECTION INTRAVENOUS at 01:26

## 2021-06-23 ENCOUNTER — OFFICE VISIT (OUTPATIENT)
Dept: FAMILY MEDICINE CLINIC | Facility: CLINIC | Age: 31
End: 2021-06-23

## 2021-06-23 VITALS
HEART RATE: 91 BPM | HEIGHT: 61 IN | BODY MASS INDEX: 55.32 KG/M2 | SYSTOLIC BLOOD PRESSURE: 118 MMHG | WEIGHT: 293 LBS | OXYGEN SATURATION: 99 % | DIASTOLIC BLOOD PRESSURE: 66 MMHG

## 2021-06-23 DIAGNOSIS — J45.30 MILD PERSISTENT ASTHMA WITHOUT COMPLICATION: ICD-10-CM

## 2021-06-23 DIAGNOSIS — Z00.00 WELLNESS EXAMINATION: Primary | ICD-10-CM

## 2021-06-23 PROCEDURE — 99395 PREV VISIT EST AGE 18-39: CPT | Performed by: FAMILY MEDICINE

## 2021-06-23 NOTE — PROGRESS NOTES
"Tala Aaron is a 31 y.o. female. Presents today for   Chief Complaint   Patient presents with   • Annual Exam     physical   • Asthma       History of Present Illness  Patient here for wellness exam;  Seen Er for pleuritic chest pain;  Had PE/ACS r/o;   Was given steroid and has helped, Resolved.  Has asthma, doing ok though;        Review of Systems   Constitutional: Negative for chills and fever.   Respiratory: Negative for shortness of breath.    Cardiovascular: Negative for chest pain, palpitations and leg swelling.   Gastrointestinal: Negative for abdominal pain, nausea and vomiting.       Patient Active Problem List   Diagnosis   • Mild intermittent asthma with status asthmaticus       Social History     Socioeconomic History   • Marital status:      Spouse name: Not on file   • Number of children: Not on file   • Years of education: Not on file   • Highest education level: Not on file   Tobacco Use   • Smoking status: Never Smoker   • Smokeless tobacco: Never Used   Substance and Sexual Activity   • Alcohol use: No   • Drug use: No       Allergies   Allergen Reactions   • Aspirin Hives and Swelling       Current Outpatient Medications on File Prior to Visit   Medication Sig Dispense Refill   • albuterol sulfate HFA (PROAIR HFA) 108 (90 Base) MCG/ACT inhaler Inhale 2 puffs Every 4 (Four) Hours As Needed for Wheezing. 8 g 11   • fluticasone-salmeterol (Advair HFA) 115-21 MCG/ACT inhaler Inhale 2 puffs 2 (Two) Times a Day. 8 g 2   • [DISCONTINUED] cephalexin (KEFLEX) 500 MG capsule Take 1 capsule by mouth 3 (Three) Times a Day. 21 capsule 0     No current facility-administered medications on file prior to visit.       Objective   Vitals:    06/23/21 1209   BP: 118/66   Pulse: 91   SpO2: 99%   Weight: (!) 138 kg (304 lb)   Height: 154.9 cm (61\")   PainSc: 0-No pain     Body mass index is 57.44 kg/m².    Physical Exam  Vitals and nursing note reviewed.   Constitutional:       Appearance: She " is well-developed.   HENT:      Head: Normocephalic and atraumatic.   Neck:      Thyroid: No thyromegaly.      Vascular: No JVD.   Cardiovascular:      Rate and Rhythm: Normal rate and regular rhythm.      Heart sounds: Normal heart sounds. No murmur heard.   No friction rub. No gallop.    Pulmonary:      Effort: Pulmonary effort is normal. No respiratory distress.      Breath sounds: Normal breath sounds. No wheezing or rales.   Abdominal:      General: Bowel sounds are normal. There is no distension.      Palpations: Abdomen is soft.      Tenderness: There is no abdominal tenderness. There is no guarding or rebound.   Musculoskeletal:      Cervical back: Neck supple.   Skin:     General: Skin is warm and dry.   Neurological:      Mental Status: She is alert.   Psychiatric:         Behavior: Behavior normal.       Component      Latest Ref Rng & Units 5/31/2021   WBC      3.40 - 10.80 10*3/mm3 11.65 (H)   RBC      3.77 - 5.28 10*6/mm3 4.97   Hemoglobin      12.0 - 15.9 g/dL 12.9   Hematocrit      34.0 - 46.6 % 40.2   MCV      79.0 - 97.0 fL 80.9   MCH      26.6 - 33.0 pg 26.0 (L)   MCHC      31.5 - 35.7 g/dL 32.1   RDW      12.3 - 15.4 % 13.9   RDW-SD      37.0 - 54.0 fl 40.4   MPV      6.0 - 12.0 fL 9.4   Platelets      140 - 450 10*3/mm3 324   Neutrophil Rel %      42.7 - 76.0 % 68.3   Lymphocyte Rel %      19.6 - 45.3 % 21.2   Monocyte Rel %      5.0 - 12.0 % 8.3   Eosinophil Rel %      0.3 - 6.2 % 1.5   Basophil Rel %      0.0 - 1.5 % 0.4   Immature Granulocyte Rel %      0.0 - 0.5 % 0.3   Neutrophils Absolute      1.70 - 7.00 10*3/mm3 7.95 (H)   Lymphocytes Absolute      0.70 - 3.10 10*3/mm3 2.47   Monocytes Absolute      0.10 - 0.90 10*3/mm3 0.97 (H)   Eosinophils Absolute      0.00 - 0.40 10*3/mm3 0.17   Basophils Absolute      0.00 - 0.20 10*3/mm3 0.05   Immature Grans, Absolute      0.00 - 0.05 10*3/mm3 0.04   nRBC      0.0 - 0.2 /100 WBC 0.0   Glucose      65 - 99 mg/dL 120 (H)   BUN      6 - 20 mg/dL 14    Creatinine      0.57 - 1.00 mg/dL 0.52 (L)   Sodium      136 - 145 mmol/L 138   Potassium      3.5 - 5.2 mmol/L 4.3   Chloride      98 - 107 mmol/L 105   CO2      22.0 - 29.0 mmol/L 18.7 (L)   Calcium      8.6 - 10.5 mg/dL 9.3   Total Protein      6.0 - 8.5 g/dL 7.5   Albumin      3.50 - 5.20 g/dL 3.90   ALT (SGPT)      1 - 33 U/L 30   AST (SGOT)      1 - 32 U/L 18   Alkaline Phosphatase      39 - 117 U/L 94   Total Bilirubin      0.0 - 1.2 mg/dL 0.3   eGFR Non African Am      >60 mL/min/1.73 138   Globulin      gm/dL 3.6   A/G Ratio      g/dL 1.1   BUN/Creatinine Ratio      7.0 - 25.0 26.9 (H)   Anion Gap      5.0 - 15.0 mmol/L 14.3   HCG Qualitative      Negative Negative   proBNP      0.0 - 450.0 pg/mL 13.8   Troponin T      0.000 - 0.030 ng/mL <0.010   Study Result    Narrative & Impression   CT ANGIOGRAM OF THE CHEST     HISTORY: Pleuritic right-sided chest pain     COMPARISON: None available.     TECHNIQUE: Axial CT imaging was obtained through the thorax. IV contrast  was administered. Three-D reformatted images were obtained.     FINDINGS:  Exam is degraded by poor timing of the contrast bolus. Large central  pulmonary thromboembolus is not seen. However, more distal vessels  cannot be assessed. The thoracic aorta is normal in caliber. Overall,  lung volumes are diminished, with some mild dependent atelectasis noted.  There is no evidence of dissection. There are no coronary artery  calcifications. The thyroid gland and trachea, and esophagus appear  unremarkable. Mediastinal lymph nodes do not appear pathologically  enlarged. Images through the upper abdomen do not demonstrate any acute  abnormalities. No acute osseous abnormalities are seen.     IMPRESSION:  Exam is degraded by poor timing of the contrast bolus. A central  pulmonary thromboembolus is not seen. More distal vessels cannot be  assessed.     Radiation dose reduction techniques were utilized, including automated  exposure control and exposure  modulation based on body size.     This report was finalized on 5/31/2021 3:09 AM by Dr. Patito Rhodes M.D.          Assessment/Plan   Diagnoses and all orders for this visit:    1. Wellness examination (Primary)    2. Mild persistent asthma without complication    counseled on diet and exercise  Asthma reportrs not needing rescue inhaler  D/w pneumovax but declines for now  Pap up to date.  Cramping reported relieved by miralax         -Follow up: 6 months and prn

## 2021-06-23 NOTE — PATIENT INSTRUCTIONS
Calorie Counting for Weight Loss  Calories are units of energy. Your body needs a certain amount of calories from food to keep you going throughout the day. When you eat more calories than your body needs, your body stores the extra calories as fat. When you eat fewer calories than your body needs, your body burns fat to get the energy it needs.  Calorie counting means keeping track of how many calories you eat and drink each day. Calorie counting can be helpful if you need to lose weight. If you make sure to eat fewer calories than your body needs, you should lose weight. Ask your health care provider what a healthy weight is for you.  For calorie counting to work, you will need to eat the right number of calories in a day in order to lose a healthy amount of weight per week. A dietitian can help you determine how many calories you need in a day and will give you suggestions on how to reach your calorie goal.  · A healthy amount of weight to lose per week is usually 1-2 lb (0.5-0.9 kg). This usually means that your daily calorie intake should be reduced by 500-750 calories.  · Eating 1,200 - 1,500 calories per day can help most women lose weight.  · Eating 1,500 - 1,800 calories per day can help most men lose weight.  What is my plan?  My goal is to have __________ calories per day.  If I have this many calories per day, I should lose around __________ pounds per week.  What do I need to know about calorie counting?  In order to meet your daily calorie goal, you will need to:  · Find out how many calories are in each food you would like to eat. Try to do this before you eat.  · Decide how much of the food you plan to eat.  · Write down what you ate and how many calories it had. Doing this is called keeping a food log.  To successfully lose weight, it is important to balance calorie counting with a healthy lifestyle that includes regular activity. Aim for 150 minutes of moderate exercise (such as walking) or 75  minutes of vigorous exercise (such as running) each week.  Where do I find calorie information?    The number of calories in a food can be found on a Nutrition Facts label. If a food does not have a Nutrition Facts label, try to look up the calories online or ask your dietitian for help.  Remember that calories are listed per serving. If you choose to have more than one serving of a food, you will have to multiply the calories per serving by the amount of servings you plan to eat. For example, the label on a package of bread might say that a serving size is 1 slice and that there are 90 calories in a serving. If you eat 1 slice, you will have eaten 90 calories. If you eat 2 slices, you will have eaten 180 calories.  How do I keep a food log?  Immediately after each meal, record the following information in your food log:  · What you ate. Don't forget to include toppings, sauces, and other extras on the food.  · How much you ate. This can be measured in cups, ounces, or number of items.  · How many calories each food and drink had.  · The total number of calories in the meal.  Keep your food log near you, such as in a small notebook in your pocket, or use a mobile ra or website. Some programs will calculate calories for you and show you how many calories you have left for the day to meet your goal.  What are some calorie counting tips?    · Use your calories on foods and drinks that will fill you up and not leave you hungry:  ? Some examples of foods that fill you up are nuts and nut butters, vegetables, lean proteins, and high-fiber foods like whole grains. High-fiber foods are foods with more than 5 g fiber per serving.  ? Drinks such as sodas, specialty coffee drinks, alcohol, and juices have a lot of calories, yet do not fill you up.  · Eat nutritious foods and avoid empty calories. Empty calories are calories you get from foods or beverages that do not have many vitamins or protein, such as candy, sweets, and  "soda. It is better to have a nutritious high-calorie food (such as an avocado) than a food with few nutrients (such as a bag of chips).  · Know how many calories are in the foods you eat most often. This will help you calculate calorie counts faster.  · Pay attention to calories in drinks. Low-calorie drinks include water and unsweetened drinks.  · Pay attention to nutrition labels for \"low fat\" or \"fat free\" foods. These foods sometimes have the same amount of calories or more calories than the full fat versions. They also often have added sugar, starch, or salt, to make up for flavor that was removed with the fat.  · Find a way of tracking calories that works for you. Get creative. Try different apps or programs if writing down calories does not work for you.  What are some portion control tips?  · Know how many calories are in a serving. This will help you know how many servings of a certain food you can have.  · Use a measuring cup to measure serving sizes. You could also try weighing out portions on a kitchen scale. With time, you will be able to estimate serving sizes for some foods.  · Take some time to put servings of different foods on your favorite plates, bowls, and cups so you know what a serving looks like.  · Try not to eat straight from a bag or box. Doing this can lead to overeating. Put the amount you would like to eat in a cup or on a plate to make sure you are eating the right portion.  · Use smaller plates, glasses, and bowls to prevent overeating.  · Try not to multitask (for example, watch TV or use your computer) while eating. If it is time to eat, sit down at a table and enjoy your food. This will help you to know when you are full. It will also help you to be aware of what you are eating and how much you are eating.  What are tips for following this plan?  Reading food labels  · Check the calorie count compared to the serving size. The serving size may be smaller than what you are used to " "eating.  · Check the source of the calories. Make sure the food you are eating is high in vitamins and protein and low in saturated and trans fats.  Shopping  · Read nutrition labels while you shop. This will help you make healthy decisions before you decide to purchase your food.  · Make a grocery list and stick to it.  Cooking  · Try to cook your favorite foods in a healthier way. For example, try baking instead of frying.  · Use low-fat dairy products.  Meal planning  · Use more fruits and vegetables. Half of your plate should be fruits and vegetables.  · Include lean proteins like poultry and fish.  How do I count calories when eating out?  · Ask for smaller portion sizes.  · Consider sharing an entree and sides instead of getting your own entree.  · If you get your own entree, eat only half. Ask for a box at the beginning of your meal and put the rest of your entree in it so you are not tempted to eat it.  · If calories are listed on the menu, choose the lower calorie options.  · Choose dishes that include vegetables, fruits, whole grains, low-fat dairy products, and lean protein.  · Choose items that are boiled, broiled, grilled, or steamed. Stay away from items that are buttered, battered, fried, or served with cream sauce. Items labeled \"crispy\" are usually fried, unless stated otherwise.  · Choose water, low-fat milk, unsweetened iced tea, or other drinks without added sugar. If you want an alcoholic beverage, choose a lower calorie option such as a glass of wine or light beer.  · Ask for dressings, sauces, and syrups on the side. These are usually high in calories, so you should limit the amount you eat.  · If you want a salad, choose a garden salad and ask for grilled meats. Avoid extra toppings like vee, cheese, or fried items. Ask for the dressing on the side, or ask for olive oil and vinegar or lemon to use as dressing.  · Estimate how many servings of a food you are given. For example, a serving of " cooked rice is ½ cup or about the size of half a baseball. Knowing serving sizes will help you be aware of how much food you are eating at restaurants. The list below tells you how big or small some common portion sizes are based on everyday objects:  ? 1 oz--4 stacked dice.  ? 3 oz--1 deck of cards.  ? 1 tsp--1 die.  ? 1 Tbsp--½ a ping-pong ball.  ? 2 Tbsp--1 ping-pong ball.  ? ½ cup--½ baseball.  ? 1 cup--1 baseball.  Summary  · Calorie counting means keeping track of how many calories you eat and drink each day. If you eat fewer calories than your body needs, you should lose weight.  · A healthy amount of weight to lose per week is usually 1-2 lb (0.5-0.9 kg). This usually means reducing your daily calorie intake by 500-750 calories.  · The number of calories in a food can be found on a Nutrition Facts label. If a food does not have a Nutrition Facts label, try to look up the calories online or ask your dietitian for help.  · Use your calories on foods and drinks that will fill you up, and not on foods and drinks that will leave you hungry.  · Use smaller plates, glasses, and bowls to prevent overeating.  This information is not intended to replace advice given to you by your health care provider. Make sure you discuss any questions you have with your health care provider.  Document Revised: 09/06/2019 Document Reviewed: 11/17/2017  [employer] Patient Education © 2020 [employer]      Exercising to Lose Weight  Exercise is structured, repetitive physical activity to improve fitness and health. Getting regular exercise is important for everyone. It is especially important if you are overweight. Being overweight increases your risk of heart disease, stroke, diabetes, high blood pressure, and several types of cancer. Reducing your calorie intake and exercising can help you lose weight.  Exercise is usually categorized as moderate or vigorous intensity. To lose weight, most people need to do a certain amount of  moderate-intensity or vigorous-intensity exercise each week.  Moderate-intensity exercise    Moderate-intensity exercise is any activity that gets you moving enough to burn at least three times more energy (calories) than if you were sitting.  Examples of moderate exercise include:  · Walking a mile in 15 minutes.  · Doing light yard work.  · Biking at an easy pace.  Most people should get at least 150 minutes (2 hours and 30 minutes) a week of moderate-intensity exercise to maintain their body weight.  Vigorous-intensity exercise  Vigorous-intensity exercise is any activity that gets you moving enough to burn at least six times more calories than if you were sitting. When you exercise at this intensity, you should be working hard enough that you are not able to carry on a conversation.  Examples of vigorous exercise include:  · Running.  · Playing a team sport, such as football, basketball, and soccer.  · Jumping rope.  Most people should get at least 75 minutes (1 hour and 15 minutes) a week of vigorous-intensity exercise to maintain their body weight.  How can exercise affect me?  When you exercise enough to burn more calories than you eat, you lose weight. Exercise also reduces body fat and builds muscle. The more muscle you have, the more calories you burn. Exercise also:  · Improves mood.  · Reduces stress and tension.  · Improves your overall fitness, flexibility, and endurance.  · Increases bone strength.  The amount of exercise you need to lose weight depends on:  · Your age.  · The type of exercise.  · Any health conditions you have.  · Your overall physical ability.  Talk to your health care provider about how much exercise you need and what types of activities are safe for you.  What actions can I take to lose weight?  Nutrition    · Make changes to your diet as told by your health care provider or diet and nutrition specialist (dietitian). This may include:  ? Eating fewer calories.  ? Eating more  protein.  ? Eating less unhealthy fats.  ? Eating a diet that includes fresh fruits and vegetables, whole grains, low-fat dairy products, and lean protein.  ? Avoiding foods with added fat, salt, and sugar.  · Drink plenty of water while you exercise to prevent dehydration or heat stroke.  Activity  · Choose an activity that you enjoy and set realistic goals. Your health care provider can help you make an exercise plan that works for you.  · Exercise at a moderate or vigorous intensity most days of the week.  ? The intensity of exercise may vary from person to person. You can tell how intense a workout is for you by paying attention to your breathing and heartbeat. Most people will notice their breathing and heartbeat get faster with more intense exercise.  · Do resistance training twice each week, such as:  ? Push-ups.  ? Sit-ups.  ? Lifting weights.  ? Using resistance bands.  · Getting short amounts of exercise can be just as helpful as long structured periods of exercise. If you have trouble finding time to exercise, try to include exercise in your daily routine.  ? Get up, stretch, and walk around every 30 minutes throughout the day.  ? Go for a walk during your lunch break.  ? Park your car farther away from your destination.  ? If you take public transportation, get off one stop early and walk the rest of the way.  ? Make phone calls while standing up and walking around.  ? Take the stairs instead of elevators or escalators.  · Wear comfortable clothes and shoes with good support.  · Do not exercise so much that you hurt yourself, feel dizzy, or get very short of breath.  Where to find more information  · [place] Department of Health and Human Services: www.hhs.gov  · Centers for Disease Control and Prevention (CDC): www.cdc.gov  Contact a health care provider:  · Before starting a new exercise program.  · If you have questions or concerns about your weight.  · If you have a medical problem that keeps you from  exercising.  Get help right away if you have any of the following while exercising:  · Injury.  · Dizziness.  · Difficulty breathing or shortness of breath that does not go away when you stop exercising.  · Chest pain.  · Rapid heartbeat.  Summary  · Being overweight increases your risk of heart disease, stroke, diabetes, high blood pressure, and several types of cancer.  · Losing weight happens when you burn more calories than you eat.  · Reducing the amount of calories you eat in addition to getting regular moderate or vigorous exercise each week helps you lose weight.  This information is not intended to replace advice given to you by your health care provider. Make sure you discuss any questions you have with your health care provider.  Document Revised: 12/31/2018 Document Reviewed: 12/31/2018  [employer] Patient Education © 2021 [employer]

## 2021-12-21 DIAGNOSIS — J45.901 MILD ASTHMA EXACERBATION: ICD-10-CM

## 2021-12-21 RX ORDER — FLUTICASONE PROPIONATE AND SALMETEROL XINAFOATE 115; 21 UG/1; UG/1
AEROSOL, METERED RESPIRATORY (INHALATION)
Qty: 12 G | Refills: 11 | Status: SHIPPED | OUTPATIENT
Start: 2021-12-21

## 2022-01-03 ENCOUNTER — OFFICE VISIT (OUTPATIENT)
Dept: FAMILY MEDICINE CLINIC | Facility: CLINIC | Age: 32
End: 2022-01-03

## 2022-01-03 VITALS
BODY MASS INDEX: 55.32 KG/M2 | DIASTOLIC BLOOD PRESSURE: 62 MMHG | SYSTOLIC BLOOD PRESSURE: 112 MMHG | HEIGHT: 61 IN | OXYGEN SATURATION: 95 % | WEIGHT: 293 LBS | HEART RATE: 125 BPM

## 2022-01-03 DIAGNOSIS — N92.6 IRREGULAR PERIODS/MENSTRUAL CYCLES: ICD-10-CM

## 2022-01-03 DIAGNOSIS — E88.81 INSULIN RESISTANCE: ICD-10-CM

## 2022-01-03 DIAGNOSIS — J45.40 MODERATE PERSISTENT ASTHMA WITHOUT COMPLICATION: Primary | ICD-10-CM

## 2022-01-03 DIAGNOSIS — J30.1 SEASONAL ALLERGIC RHINITIS DUE TO POLLEN: ICD-10-CM

## 2022-01-03 DIAGNOSIS — L70.0 ACNE VULGARIS: ICD-10-CM

## 2022-01-03 DIAGNOSIS — L68.0 HIRSUTISM: ICD-10-CM

## 2022-01-03 DIAGNOSIS — R73.03 PREDIABETES: ICD-10-CM

## 2022-01-03 DIAGNOSIS — E66.01 MORBID (SEVERE) OBESITY DUE TO EXCESS CALORIES: ICD-10-CM

## 2022-01-03 PROCEDURE — 99214 OFFICE O/P EST MOD 30 MIN: CPT | Performed by: FAMILY MEDICINE

## 2022-01-03 RX ORDER — ALBUTEROL SULFATE 90 UG/1
2 AEROSOL, METERED RESPIRATORY (INHALATION) EVERY 4 HOURS PRN
Qty: 8 G | Refills: 11 | Status: SHIPPED | OUTPATIENT
Start: 2022-01-03

## 2022-01-03 RX ORDER — MONTELUKAST SODIUM 10 MG/1
10 TABLET ORAL NIGHTLY
Qty: 90 TABLET | Refills: 3 | Status: SHIPPED | OUTPATIENT
Start: 2022-01-03 | End: 2023-01-09

## 2022-01-03 NOTE — PROGRESS NOTES
Tala Aaron is a 31 y.o. female. Presents today for   Chief Complaint   Patient presents with   • Asthma       Asthma  There is no cough, shortness of breath, sputum production or wheezing. This is a chronic problem. The current episode started more than 1 month ago. The problem occurs constantly. The problem has been unchanged. Associated symptoms include dyspnea on exertion (if over exerts) and rhinorrhea (a lot in am with pnd). Pertinent negatives include no fever, nasal congestion, orthopnea or PND. Her symptoms are alleviated by beta-agonist and steroid inhaler. She reports moderate improvement on treatment. Her past medical history is significant for asthma.     Patient reprots told insulin resistant and on metformin;  Has irregular  Periods;  Has acne frequently around periods;  Unwanted hair growth.  Had negative u/s.    Review of Systems   Constitutional: Negative for fever.   HENT: Positive for rhinorrhea (a lot in am with pnd).    Respiratory: Negative for cough, sputum production, shortness of breath and wheezing.    Cardiovascular: Positive for dyspnea on exertion (if over exerts). Negative for PND.       Patient Active Problem List   Diagnosis   • Mild intermittent asthma with status asthmaticus       Social History     Socioeconomic History   • Marital status:    Tobacco Use   • Smoking status: Never Smoker   • Smokeless tobacco: Never Used   Substance and Sexual Activity   • Alcohol use: No   • Drug use: No       Allergies   Allergen Reactions   • Aspirin Hives and Swelling       Current Outpatient Medications on File Prior to Visit   Medication Sig Dispense Refill   • Advair -21 MCG/ACT inhaler INHALE TWO PUFFS BY MOUTH TWICE A DAY 12 g 11   • [DISCONTINUED] albuterol sulfate HFA (PROAIR HFA) 108 (90 Base) MCG/ACT inhaler Inhale 2 puffs Every 4 (Four) Hours As Needed for Wheezing. 8 g 11     No current facility-administered medications on file prior to visit.  "      Objective   Vitals:    01/03/22 0822   BP: 112/62   Pulse: (!) 125   SpO2: 95%   Weight: (!) 142 kg (312 lb)   Height: 154.9 cm (61\")     Body mass index is 58.95 kg/m².    Physical Exam  Vitals and nursing note reviewed.   Constitutional:       Appearance: She is well-developed.   HENT:      Head: Normocephalic and atraumatic.   Neck:      Thyroid: No thyromegaly.      Vascular: No JVD.   Cardiovascular:      Rate and Rhythm: Normal rate and regular rhythm.      Heart sounds: Normal heart sounds. No murmur heard.  No friction rub. No gallop.    Pulmonary:      Effort: Pulmonary effort is normal. No respiratory distress.      Breath sounds: Normal breath sounds. No wheezing or rales.   Abdominal:      General: Bowel sounds are normal. There is no distension.      Palpations: Abdomen is soft.      Tenderness: There is no abdominal tenderness. There is no guarding or rebound.   Musculoskeletal:      Cervical back: Neck supple.   Skin:     General: Skin is warm and dry.   Neurological:      Mental Status: She is alert.   Psychiatric:         Behavior: Behavior normal.         Assessment/Plan   Diagnoses and all orders for this visit:    1. Moderate persistent asthma without complication (Primary)  -     albuterol sulfate HFA (ProAir HFA) 108 (90 Base) MCG/ACT inhaler; Inhale 2 puffs Every 4 (Four) Hours As Needed for Wheezing.  Dispense: 8 g; Refill: 11    2. Seasonal allergic rhinitis due to pollen  -     montelukast (Singulair) 10 MG tablet; Take 1 tablet by mouth Every Night.  Dispense: 90 tablet; Refill: 3    3. Hirsutism  -     FSH & LH  -     Testosterone, Free, Total  -     DHEA-Sulfate    4. Acne vulgaris  -     Comprehensive Metabolic Panel  -     Testosterone, Free, Total    5. Insulin resistance  -     Comprehensive Metabolic Panel  -     Hemoglobin A1c  -     Insulin, Total  -     C-Peptide    6. Prediabetes  -     Hemoglobin A1c  -     Insulin, Total  -     C-Peptide    7. Irregular periods/menstrual " cycles  -     FSH & LH  -     Testosterone, Free, Total  -     DHEA-Sulfate    8. Morbid (severe) obesity due to excess calories (HCC)  -     Comprehensive Metabolic Panel  -     Lipid Panel      Will add singulair as had in past with good response for asthma and alelrgies;    Was on metfomrin fo rinsulin resistance;  Will restart if labs abnormal  Has some symptoms of pcos, u/s neg; does desire pregnancy;  Has seen Dr. Ni       -Follow up: 3 months and prn

## 2022-02-08 ENCOUNTER — OFFICE VISIT (OUTPATIENT)
Dept: OBSTETRICS AND GYNECOLOGY | Facility: CLINIC | Age: 32
End: 2022-02-08

## 2022-02-08 VITALS
HEIGHT: 61 IN | DIASTOLIC BLOOD PRESSURE: 80 MMHG | BODY MASS INDEX: 55.32 KG/M2 | WEIGHT: 293 LBS | SYSTOLIC BLOOD PRESSURE: 148 MMHG

## 2022-02-08 DIAGNOSIS — N89.8 VAGINAL DISCHARGE: ICD-10-CM

## 2022-02-08 DIAGNOSIS — N89.8 VAGINAL LESION: Primary | ICD-10-CM

## 2022-02-08 PROCEDURE — 99213 OFFICE O/P EST LOW 20 MIN: CPT | Performed by: NURSE PRACTITIONER

## 2022-02-08 RX ORDER — CHLORHEXIDINE GLUCONATE 213 G/1000ML
SOLUTION TOPICAL DAILY PRN
Qty: 236 ML | Refills: 12 | Status: SHIPPED | OUTPATIENT
Start: 2022-02-08 | End: 2022-04-13

## 2022-02-10 RX ORDER — METRONIDAZOLE 500 MG/1
500 TABLET ORAL 2 TIMES DAILY
Qty: 14 TABLET | Refills: 0 | Status: SHIPPED | OUTPATIENT
Start: 2022-02-10 | End: 2022-02-17

## 2022-03-03 ENCOUNTER — TELEPHONE (OUTPATIENT)
Dept: FAMILY MEDICINE CLINIC | Facility: CLINIC | Age: 32
End: 2022-03-03

## 2022-03-03 DIAGNOSIS — D69.6 THROMBOCYTOPENIA: ICD-10-CM

## 2022-03-03 DIAGNOSIS — M25.60 JOINT STIFFNESS: Primary | ICD-10-CM

## 2022-03-03 NOTE — TELEPHONE ENCOUNTER
Pt said she has joint stiffness and joints popping in the morning when she gets up and stretches. Pt sees Dr. Ni for gynecology.

## 2022-03-03 NOTE — TELEPHONE ENCOUNTER
A1c in prediabetes range and insulin mildly elevated suggesting insulin resistance.  Has elevated testosterone level.   [facility] - autoimmune lab was ordered and negative;  Rheumatoid factor was borderline, may be her normal as small % of population has borderline elevated.  Does she have joint stiffness?   Cholesterol mildly elevated.  Kidney/liver function ok.  Recommend see gynecology for pcos, may want to also see reproductive endocrinology if desiring pregnancy?

## 2022-03-03 NOTE — TELEPHONE ENCOUNTER
Ok, have return for expanded rheumatoid arthritis panel.  I put in orders.  Also platelet count was low, so rechecking to see if accurate.  RRJ

## 2022-03-14 LAB
ANA SER QL: NEGATIVE
BASOPHILS # BLD AUTO: 0.1 X10E3/UL (ref 0–0.2)
BASOPHILS NFR BLD AUTO: 1 %
CCP IGA+IGG SERPL IA-ACNC: 7 UNITS (ref 0–19)
CRP SERPL-MCNC: 17 MG/L (ref 0–10)
EOSINOPHIL # BLD AUTO: 0.2 X10E3/UL (ref 0–0.4)
EOSINOPHIL NFR BLD AUTO: 2 %
ERYTHROCYTE [DISTWIDTH] IN BLOOD BY AUTOMATED COUNT: 13.8 % (ref 11.7–15.4)
ERYTHROCYTE [SEDIMENTATION RATE] IN BLOOD BY WESTERGREN METHOD: 71 MM/HR (ref 0–32)
HCT VFR BLD AUTO: 42.7 % (ref 34–46.6)
HGB BLD-MCNC: 13.8 G/DL (ref 11.1–15.9)
IMM GRANULOCYTES # BLD AUTO: 0 X10E3/UL (ref 0–0.1)
IMM GRANULOCYTES NFR BLD AUTO: 0 %
LYMPHOCYTES # BLD AUTO: 3 X10E3/UL (ref 0.7–3.1)
LYMPHOCYTES NFR BLD AUTO: 26 %
MCH RBC QN AUTO: 25.8 PG (ref 26.6–33)
MCHC RBC AUTO-ENTMCNC: 32.3 G/DL (ref 31.5–35.7)
MCV RBC AUTO: 80 FL (ref 79–97)
MONOCYTES # BLD AUTO: 0.9 X10E3/UL (ref 0.1–0.9)
MONOCYTES NFR BLD AUTO: 7 %
NEUTROPHILS # BLD AUTO: 7.6 X10E3/UL (ref 1.4–7)
NEUTROPHILS NFR BLD AUTO: 64 %
PATH INTERP BLD-IMP: ABNORMAL
PATH REV BLD -IMP: ABNORMAL
PATHOLOGIST NAME: ABNORMAL
PLATELET # BLD AUTO: 413 X10E3/UL (ref 150–450)
RBC # BLD AUTO: 5.35 X10E6/UL (ref 3.77–5.28)
RHEUMATOID FACT SERPL-ACNC: 22.2 IU/ML
WBC # BLD AUTO: 11.8 X10E3/UL (ref 3.4–10.8)

## 2022-03-14 NOTE — PROGRESS NOTES
Call results to patient.  [facility]ISTERIO and ccp - autoimmune labs are normal  Rheumatoid factor is borderline elevated and lauraley normal given others ok  She does have elevated inflammatory markers and wbc.  Has she bee sick?  Fevers?  Cough?  Urinary symptoms?

## 2022-03-21 ENCOUNTER — TELEPHONE (OUTPATIENT)
Dept: FAMILY MEDICINE CLINIC | Facility: CLINIC | Age: 32
End: 2022-03-21

## 2022-03-21 DIAGNOSIS — D72.828 OTHER ELEVATED WHITE BLOOD CELL (WBC) COUNT: Primary | ICD-10-CM

## 2022-03-21 NOTE — TELEPHONE ENCOUNTER
Have stop in 2 to 3 weeks to recheck labs.  If any changes, let us know.        ----- Message from Linh Mendez MA sent at 3/14/2022  6:06 PM EDT -----  Pt said she doesn't have any of these symptoms or had any recently. She is aware of her lab results

## 2022-04-06 LAB
BASOPHILS # BLD AUTO: 0.1 X10E3/UL (ref 0–0.2)
BASOPHILS NFR BLD AUTO: 1 %
BUN SERPL-MCNC: 9 MG/DL (ref 6–20)
BUN/CREAT SERPL: 11 (ref 9–23)
CALCIUM SERPL-MCNC: 9.4 MG/DL (ref 8.7–10.2)
CHLORIDE SERPL-SCNC: 104 MMOL/L (ref 96–106)
CO2 SERPL-SCNC: 21 MMOL/L (ref 20–29)
CREAT SERPL-MCNC: 0.84 MG/DL (ref 0.57–1)
CRP SERPL-MCNC: 21 MG/L (ref 0–10)
EGFRCR SERPLBLD CKD-EPI 2021: 95 ML/MIN/1.73
EOSINOPHIL # BLD AUTO: 0.2 X10E3/UL (ref 0–0.4)
EOSINOPHIL NFR BLD AUTO: 2 %
ERYTHROCYTE [DISTWIDTH] IN BLOOD BY AUTOMATED COUNT: 13.6 % (ref 11.7–15.4)
ERYTHROCYTE [SEDIMENTATION RATE] IN BLOOD BY WESTERGREN METHOD: 68 MM/HR (ref 0–32)
GLUCOSE SERPL-MCNC: 93 MG/DL (ref 65–99)
HCT VFR BLD AUTO: 38.6 % (ref 34–46.6)
HGB BLD-MCNC: 12.4 G/DL (ref 11.1–15.9)
IMM GRANULOCYTES # BLD AUTO: 0.1 X10E3/UL (ref 0–0.1)
IMM GRANULOCYTES NFR BLD AUTO: 1 %
LYMPHOCYTES # BLD AUTO: 2.7 X10E3/UL (ref 0.7–3.1)
LYMPHOCYTES NFR BLD AUTO: 25 %
MCH RBC QN AUTO: 25.8 PG (ref 26.6–33)
MCHC RBC AUTO-ENTMCNC: 32.1 G/DL (ref 31.5–35.7)
MCV RBC AUTO: 80 FL (ref 79–97)
MONOCYTES # BLD AUTO: 0.8 X10E3/UL (ref 0.1–0.9)
MONOCYTES NFR BLD AUTO: 7 %
NEUTROPHILS # BLD AUTO: 7.3 X10E3/UL (ref 1.4–7)
NEUTROPHILS NFR BLD AUTO: 64 %
PLATELET # BLD AUTO: 401 X10E3/UL (ref 150–450)
POTASSIUM SERPL-SCNC: 4.6 MMOL/L (ref 3.5–5.2)
RBC # BLD AUTO: 4.81 X10E6/UL (ref 3.77–5.28)
SODIUM SERPL-SCNC: 138 MMOL/L (ref 134–144)
WBC # BLD AUTO: 11.1 X10E3/UL (ref 3.4–10.8)

## 2022-04-13 ENCOUNTER — OFFICE VISIT (OUTPATIENT)
Dept: FAMILY MEDICINE CLINIC | Facility: CLINIC | Age: 32
End: 2022-04-13

## 2022-04-13 ENCOUNTER — TELEPHONE (OUTPATIENT)
Dept: FAMILY MEDICINE CLINIC | Facility: CLINIC | Age: 32
End: 2022-04-13

## 2022-04-13 VITALS
BODY MASS INDEX: 55.32 KG/M2 | HEART RATE: 116 BPM | WEIGHT: 293 LBS | OXYGEN SATURATION: 98 % | DIASTOLIC BLOOD PRESSURE: 72 MMHG | SYSTOLIC BLOOD PRESSURE: 112 MMHG | HEIGHT: 61 IN

## 2022-04-13 DIAGNOSIS — J45.40 MODERATE PERSISTENT ASTHMA WITHOUT COMPLICATION: Primary | ICD-10-CM

## 2022-04-13 DIAGNOSIS — E28.2 PCOS (POLYCYSTIC OVARIAN SYNDROME): ICD-10-CM

## 2022-04-13 DIAGNOSIS — R53.83 OTHER FATIGUE: Primary | ICD-10-CM

## 2022-04-13 DIAGNOSIS — E88.81 INSULIN RESISTANCE: ICD-10-CM

## 2022-04-13 PROCEDURE — 99214 OFFICE O/P EST MOD 30 MIN: CPT | Performed by: FAMILY MEDICINE

## 2022-04-13 RX ORDER — METFORMIN HYDROCHLORIDE 500 MG/1
500 TABLET, EXTENDED RELEASE ORAL
Qty: 30 TABLET | Refills: 5 | Status: SHIPPED | OUTPATIENT
Start: 2022-04-13 | End: 2022-10-10

## 2022-04-13 NOTE — TELEPHONE ENCOUNTER
Caller: Jose Alfredo Aaron    Relationship: Self    Best call back number: [phone]     What is the medical concern/diagnosis: SNORING    What specialty or service is being requested: SLEEP STUDY    Any additional details: PATIENT WAS JUST IN THE OFFICE TODAY AND FORGOT TO TALK WITH DR. GAFFNEY ABOUT POSSIBLY GETTING A SLEEP STUDY. HER  SAYS SHE HAS BEEN KEEPING HIM UP AT NIGHT FROM SNORING, TO THE POINT THAT THEY ARE BOTH CONCERNED.

## 2022-04-13 NOTE — PROGRESS NOTES
Tala Aaron is a 32 y.o. female. Presents today for   Chief Complaint   Patient presents with   • Asthma       Asthma  She complains of shortness of breath and wheezing (greatly improved). There is no frequent throat clearing or sputum production. This is a chronic problem. The current episode started more than 1 month ago. The problem occurs constantly. Associated symptoms include nasal congestion (better). Pertinent negatives include no orthopnea or PND. Her symptoms are alleviated by leukotriene antagonist, steroid inhaler and beta-agonist (not needing rescue inhaler as much on singulair;  allergy symptosm better). She reports significant improvement on treatment. Her past medical history is significant for asthma.   WBC and crp/esr elevated, denies f/c;  No uti symptoms;  Breathing greatly improved;   Reports stiff back and hands only.   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   Has pcos and describes anovolutory bleeding recently.      Review of Systems   Respiratory: Positive for shortness of breath and wheezing (greatly improved). Negative for sputum production.    Cardiovascular: Negative for PND.       Patient Active Problem List   Diagnosis   • Mild intermittent asthma with status asthmaticus       Social History     Socioeconomic History   • Marital status:    Tobacco Use   • Smoking status: Never Smoker   • Smokeless tobacco: Never Used   Substance and Sexual Activity   • Alcohol use: No   • Drug use: No       Allergies   Allergen Reactions   • Aspirin Hives and Swelling       Current Outpatient Medications on File Prior to Visit   Medication Sig Dispense Refill   • Advair -21 MCG/ACT inhaler INHALE TWO PUFFS BY MOUTH TWICE A DAY 12 g 11   • albuterol sulfate HFA (ProAir HFA) 108 (90 Base) MCG/ACT inhaler Inhale 2 puffs Every 4 (Four) Hours As Needed for Wheezing. 8 g 11   • montelukast (Singulair) 10 MG tablet Take 1 tablet by mouth Every Night. 90 tablet 3   •  "[DISCONTINUED] amoxicillin (AMOXIL) 875 MG tablet Take 1 tablet by mouth 2 (Two) Times a Day. 14 tablet 0   • [DISCONTINUED] chlorhexidine (Hibiclens) 4 % external liquid Apply  topically to the appropriate area as directed Daily As Needed for Wound Care. 236 mL 12     No current facility-administered medications on file prior to visit.       Objective   Vitals:    04/13/22 0901   BP: 112/72   Pulse: 116   SpO2: 98%   Weight: (!) 142 kg (314 lb)   Height: 154.9 cm (61\")     Body mass index is 59.33 kg/m².    Physical Exam  Vitals and nursing note reviewed.   Constitutional:       Appearance: She is well-developed.   HENT:      Head: Normocephalic and atraumatic.   Neck:      Thyroid: No thyromegaly.      Vascular: No JVD.   Cardiovascular:      Rate and Rhythm: Normal rate and regular rhythm.      Heart sounds: Normal heart sounds. No murmur heard.    No friction rub. No gallop.   Pulmonary:      Effort: Pulmonary effort is normal. No respiratory distress.      Breath sounds: Normal breath sounds. No wheezing or rales.   Abdominal:      General: Bowel sounds are normal. There is no distension.      Palpations: Abdomen is soft.      Tenderness: There is no abdominal tenderness. There is no guarding or rebound.   Musculoskeletal:      Cervical back: Neck supple.   Skin:     General: Skin is warm and dry.   Neurological:      Mental Status: She is alert.   Psychiatric:         Behavior: Behavior normal.       Component      Latest Ref Rng & Units 3/10/2022 3/10/2022 3/10/2022 4/5/2022          10:57 AM 10:57 AM 10:57 AM    WBC      3.4 - 10.8 x10E3/uL  Comment 11.8 (H) 11.1 (H)   RBC      3.77 - 5.28 x10E6/uL  Comment 5.35 (H) 4.81   Platelets      150 - 450 x10E3/uL  Appear normal. 413 401   Comment        Comment     Pathologist Review        Comment     Hemoglobin      11.1 - 15.9 g/dL  13.8  12.4   Hematocrit      34.0 - 46.6 %  42.7  38.6   MCV      79 - 97 fL  80  80   MCH      26.6 - 33.0 pg  25.8 (L)  25.8 (L) "   MCHC      31.5 - 35.7 g/dL  32.3  32.1   RDW      11.7 - 15.4 %  13.8  13.6   Neutrophil Rel %      Not Estab. %  64  64   Lymphocyte Rel %      Not Estab. %  26  25   Monocyte Rel %      Not Estab. %  7  7   Eosinophil Rel %      Not Estab. %  2  2   Basophil Rel %      Not Estab. %  1  1   Neutrophils Absolute      1.4 - 7.0 x10E3/uL  7.6 (H)  7.3 (H)   Lymphocytes Absolute      0.7 - 3.1 x10E3/uL  3.0  2.7   Monocytes Absolute      0.1 - 0.9 x10E3/uL  0.9  0.8   Eosinophils Absolute      0.0 - 0.4 x10E3/uL  0.2  0.2   Basophils Absolute      0.0 - 0.2 x10E3/uL  0.1  0.1   Immature Granulocyte Rel %      Not Estab. %  0  1   Immature Grans, Absolute      0.0 - 0.1 x10E3/uL  0.0  0.1   Glucose      65 - 99 mg/dL    93   BUN      6 - 20 mg/dL    9   Creatinine      0.57 - 1.00 mg/dL    0.84   EGFR Result      >59 mL/min/1.73    95   BUN/Creatinine Ratio      9 - 23    11   Sodium      134 - 144 mmol/L    138   Potassium      3.5 - 5.2 mmol/L    4.6   Chloride      96 - 106 mmol/L    104   CO2      20 - 29 mmol/L    21   Calcium      8.7 - 10.2 mg/dL    9.4   CCP Antibodies IgG/IgA      0 - 19 units 7      C-Reactive Protein      0 - 10 mg/L 17 (H)   21 (H)   MINISTERIO Direct      Negative Negative      RA Latex Turbid      <14.0 IU/mL 22.2 (H)      Sed Rate      0 - 32 mm/hr 71 (H)   68 (H)     Assessment/Plan   Diagnoses and all orders for this visit:    1. Moderate persistent asthma without complication (Primary)    2. PCOS (polycystic ovarian syndrome)  -     metFORMIN ER (GLUCOPHAGE-XR) 500 MG 24 hr tablet; Take 1 tablet by mouth Daily With Breakfast.  Dispense: 30 tablet; Refill: 5    3. Insulin resistance  -     metFORMIN ER (GLUCOPHAGE-XR) 500 MG 24 hr tablet; Take 1 tablet by mouth Daily With Breakfast.  Dispense: 30 tablet; Refill: 5    will try metfomrin for pcos and insulin resistance  Asthma well controlled now. contineu advair and singulair         -Follow up: 3 months and prn

## 2022-07-06 ENCOUNTER — OFFICE VISIT (OUTPATIENT)
Dept: SLEEP MEDICINE | Facility: HOSPITAL | Age: 32
End: 2022-07-06

## 2022-07-06 VITALS
OXYGEN SATURATION: 98 % | SYSTOLIC BLOOD PRESSURE: 112 MMHG | BODY MASS INDEX: 55.32 KG/M2 | WEIGHT: 293 LBS | HEART RATE: 114 BPM | DIASTOLIC BLOOD PRESSURE: 56 MMHG | HEIGHT: 61 IN

## 2022-07-06 DIAGNOSIS — G47.30 HYPERSOMNIA WITH SLEEP APNEA: Primary | ICD-10-CM

## 2022-07-06 DIAGNOSIS — E66.01 CLASS 3 SEVERE OBESITY DUE TO EXCESS CALORIES WITH SERIOUS COMORBIDITY AND BODY MASS INDEX (BMI) OF 50.0 TO 59.9 IN ADULT: ICD-10-CM

## 2022-07-06 DIAGNOSIS — R53.83 OTHER FATIGUE: ICD-10-CM

## 2022-07-06 DIAGNOSIS — G47.10 HYPERSOMNIA WITH SLEEP APNEA: Primary | ICD-10-CM

## 2022-07-06 PROCEDURE — 99204 OFFICE O/P NEW MOD 45 MIN: CPT | Performed by: INTERNAL MEDICINE

## 2022-07-06 PROCEDURE — G0463 HOSPITAL OUTPT CLINIC VISIT: HCPCS

## 2022-07-06 NOTE — PROGRESS NOTES
[facility]ultation       Reason for Consultation: BEN    Patient Care Team:  Chris Locke DO as PCP - General (Family Medicine)  Bharathi West MD as Consulting Physician (Sleep Medicine)    Chief complaint: Snoring and exhausted    History of present illness:    Thank you for asking me to see your patient.  The patient is a 32 y.o. female who reports that her  says that she wakes him due to her snoring.  It is worsening.  Additionally, she has awakened herself snoring at times.  She reports her mother has been diagnosed with obstructive sleep apnea and her dad probably has it.    The patient goes to bed 11:30 PM and awakens between 7 and 7:30 AM.  She is exhausted upon arising.  She will take 1 or 2 naps weekly.  [place] Sleepiness Scale borderline normal at 7.  The patient has gained 100 pounds in the last several years.  She has awaken gasping for breath.  She has a dry mouth in the morning.  She does sweat excessively during sleep.  She has problems falling asleep.  She would not use the restroom during the nighttime.    Review of Systems:    A complete review of systems was done and all were negative with the exception of always being too cold    History:  Past Medical History:   Diagnosis Date   • Asthma    • Headache    • Kidney stone    • Low back pain    , History reviewed. No pertinent surgical history.,   Family History   Problem Relation Age of Onset   • Sleep apnea Mother    • Diabetes Mother    • Heart disease Mother    • Hypertension Mother    • Bipolar disorder Mother    • Diabetes Maternal Grandmother     and   Social History     Socioeconomic History   • Marital status:    Tobacco Use   • Smoking status: Never Smoker   • Smokeless tobacco: Never Used   Vaping Use   • Vaping Use: Never used   Substance and Sexual Activity   • Alcohol use: No   • Drug use: No     E-cigarette/Vaping   • E-cigarette/Vaping Use Never User      E-cigarette/Vaping  "Substances     E-cigarette/Vaping Devices        Social History: She is a .  1 or 2 caffeinated beverages a day.    Allergies:  Aspirin     Medication Review: Her list was reviewed.    Vital Signs:    Vitals:    07/06/22 0700   BP: 112/56   Pulse: 114   SpO2: 98%   Weight: (!) 142 kg (313 lb)   Height: 154.9 cm (61\")      Body mass index is 59.14 kg/m².  Neck Circumference: 17 inches      Physical Exam:    Constitutional:  Well developed 32 y.o. female that appears in no apparent distress.  Awake & oriented times 3.  Normal mood with normal recent and remote memory and normal judgement.  Eyes:  Conjunctivae normal.  Oropharynx: Moist mucous membranes without exudate and a large tongue that is scalloped and class III Mallampati airway.  Patient wearing a facemask.  Neck: Trachea midline  Respiratory: Effort is not labored  Cardiovascular: Radial pulse regular  Musculoskeletal: Gait appears normal, no digital clubbing evident, trace pre-tibial edema    Results Review: I reviewed her 2-week sleep log.    Impression:   Snoring with awakening gasping for breath as well as complaints of hypersomnolence with borderline normal [place] Sleepiness Scale of 7.  The patient's STOP-BANG score is 5 which has a high pretest probability of having BEN.    Plan:  Good sleep hygiene measures should be maintained.  Weight loss would be beneficial in this patient who has class III severe obesity by Body mass index is 59.14 kg/m²..    Pathophysiology of BEN described to the patient.  Cardiovascular complications of untreated BEN also reviewed.      After reviewing all with the patient, I would recommend and she is agreeable to proceed with a home sleep study.  I answered all of her questions related to home sleep study.  Additionally, based on STOP-BANG with high pretest probability of having BEN, I describe CPAP therapy.  If CPAP therapy appropriate, she is willing to be set up prior to follow-up.  As stated, all was explained " to the patient and I answered all of her questions.  Home sleep study will be scheduled.    Thank you for requesting me to assist in this patient's care.    Bharathi West MD  Sleep Medicine  07/10/22  11:30 EDT

## 2022-07-10 PROBLEM — G47.30 HYPERSOMNIA WITH SLEEP APNEA: Status: ACTIVE | Noted: 2022-07-10

## 2022-07-10 PROBLEM — E66.813 CLASS 3 SEVERE OBESITY DUE TO EXCESS CALORIES WITH SERIOUS COMORBIDITY AND BODY MASS INDEX (BMI) OF 50.0 TO 59.9 IN ADULT: Status: ACTIVE | Noted: 2022-07-10

## 2022-07-10 PROBLEM — E66.01 CLASS 3 SEVERE OBESITY DUE TO EXCESS CALORIES WITH SERIOUS COMORBIDITY AND BODY MASS INDEX (BMI) OF 50.0 TO 59.9 IN ADULT (HCC): Status: ACTIVE | Noted: 2022-07-10

## 2022-07-10 PROBLEM — G47.10 HYPERSOMNIA WITH SLEEP APNEA: Status: ACTIVE | Noted: 2022-07-10

## 2022-07-19 ENCOUNTER — OFFICE VISIT (OUTPATIENT)
Dept: FAMILY MEDICINE CLINIC | Facility: CLINIC | Age: 32
End: 2022-07-19

## 2022-07-19 VITALS
OXYGEN SATURATION: 93 % | SYSTOLIC BLOOD PRESSURE: 114 MMHG | DIASTOLIC BLOOD PRESSURE: 76 MMHG | BODY MASS INDEX: 55.32 KG/M2 | HEIGHT: 61 IN | WEIGHT: 293 LBS | HEART RATE: 124 BPM

## 2022-07-19 DIAGNOSIS — E28.2 PCOS (POLYCYSTIC OVARIAN SYNDROME): ICD-10-CM

## 2022-07-19 DIAGNOSIS — E16.1 HYPERINSULINEMIA: ICD-10-CM

## 2022-07-19 DIAGNOSIS — Z00.00 WELLNESS EXAMINATION: Primary | ICD-10-CM

## 2022-07-19 DIAGNOSIS — E66.01 MORBID (SEVERE) OBESITY DUE TO EXCESS CALORIES: ICD-10-CM

## 2022-07-19 DIAGNOSIS — R73.03 PREDIABETES: ICD-10-CM

## 2022-07-19 DIAGNOSIS — E78.5 DYSLIPIDEMIA: ICD-10-CM

## 2022-07-19 DIAGNOSIS — D72.829 LEUKOCYTOSIS, UNSPECIFIED TYPE: ICD-10-CM

## 2022-07-19 PROCEDURE — 99395 PREV VISIT EST AGE 18-39: CPT | Performed by: FAMILY MEDICINE

## 2022-07-19 NOTE — PATIENT INSTRUCTIONS
"\"6-7-1-Almost None!\"  Healthy Habits Start Early    EAT 5 OR MORE SERVINGS OF VEGETABLES AND FRUITS EVERY DAY.    Help Jose Alfredo get three vegetables and two fruits each day. Red, green, yellow, orange...encourage them to try all the colors so they can enjoy different flavors and get more vitamins.    How can I help Jose Alfredo do this?  ---------------------------------------------  -BE PATIENT WITH Jose Alfredo, remember it may take 10 times before they start to like new food. So, start with small bites and just keep trying.  -Serve at least one vegetable or fruit at every meal. Even try two. Remember, portions do not have to be as big as you think.  -Encourage eating fruits and vegetables instead of drinking them..it's a better way to get fiber and vitamins..so limit the amount of juice to 1/2 cup per day for children 1-6 years and one cup per day for children 7-18 years of age. Try using 1/2 part water and 1/2 part juice.    Spend less than two hours per day watching television and other screen media. Screen media includes video games, movies and computer use for entertainment.    How can I help Jose Alfredo do this?  -Turn off the TV at dinner. Dinner is the best time to hang out with your kids and just talk, learn about their day, and tell them about your day. Your kids have a lot to learn from you and dinner is a great time to share.  "

## 2022-07-19 NOTE — PROGRESS NOTES
Tala Aaron is a 32 y.o. female. Presents today for   Chief Complaint   Patient presents with   • Annual Exam     Wellness     • Allergies       History of Present Illness  Patient here for wellness exam;  No cp/soa;  Has asthma well controlled;  Has PCOS and had elevated inflammatory markers/wbc;  Denies joint pain or swelling;  Feels good; ON metformin for pcos;  No RA/Lupus.   Strong fam hx of diabetes;   A1C predm range.  Family hx of dm2 and breast cancer Mom;  gma dm2        Review of Systems   Respiratory: Negative for shortness of breath.    Cardiovascular: Negative for chest pain and palpitations.   Musculoskeletal: Negative for arthralgias and joint swelling.       Patient Active Problem List   Diagnosis   • Mild intermittent asthma with status asthmaticus   • Hypersomnia with sleep apnea   • Class 3 severe obesity due to excess calories with serious comorbidity and body mass index (BMI) of 50.0 to 59.9 in adult (HCC)       Social History     Socioeconomic History   • Marital status:    Tobacco Use   • Smoking status: Never Smoker   • Smokeless tobacco: Never Used   Vaping Use   • Vaping Use: Never used   Substance and Sexual Activity   • Alcohol use: No   • Drug use: No       Allergies   Allergen Reactions   • Aspirin Hives and Swelling       Current Outpatient Medications on File Prior to Visit   Medication Sig Dispense Refill   • Advair -21 MCG/ACT inhaler INHALE TWO PUFFS BY MOUTH TWICE A DAY 12 g 11   • albuterol sulfate HFA (ProAir HFA) 108 (90 Base) MCG/ACT inhaler Inhale 2 puffs Every 4 (Four) Hours As Needed for Wheezing. 8 g 11   • metFORMIN ER (GLUCOPHAGE-XR) 500 MG 24 hr tablet Take 1 tablet by mouth Daily With Breakfast. 30 tablet 5   • montelukast (Singulair) 10 MG tablet Take 1 tablet by mouth Every Night. 90 tablet 3     No current facility-administered medications on file prior to visit.       Objective   Vitals:    07/19/22 0904   BP: 114/76   Pulse: (!) 124  "  SpO2: 93%   Weight: (!) 142 kg (314 lb)   Height: 154.9 cm (61\")     Body mass index is 59.33 kg/m².    Physical Exam  Vitals and nursing note reviewed.   Constitutional:       Appearance: She is well-developed.   HENT:      Head: Normocephalic and atraumatic.   Neck:      Thyroid: No thyromegaly.      Vascular: No JVD.   Cardiovascular:      Rate and Rhythm: Normal rate and regular rhythm.      Heart sounds: Normal heart sounds. No murmur heard.    No friction rub. No gallop.   Pulmonary:      Effort: Pulmonary effort is normal. No respiratory distress.      Breath sounds: Normal breath sounds. No wheezing or rales.   Abdominal:      General: Bowel sounds are normal. There is no distension.      Palpations: Abdomen is soft.      Tenderness: There is no abdominal tenderness. There is no guarding or rebound.   Musculoskeletal:      Cervical back: Neck supple.   Skin:     General: Skin is warm and dry.   Neurological:      Mental Status: She is alert.   Psychiatric:         Behavior: Behavior normal.         Assessment & Plan   Diagnoses and all orders for this visit:    1. Wellness examination (Primary)    2. Morbid (severe) obesity due to excess calories (HCC)      -     Comprehensive Metabolic Panel  -     C-Peptide  -     Insulin, Total  -     DHEA-Sulfate  -     Testosterone, Free, Total    -     Hemoglobin A1c      -     Comprehensive Metabolic Panel  -     Lipid Panel      -     Hemoglobin A1c  -     C-Peptide  -     Insulin, Total      -     CBC & Differential  -     Peripheral Blood Smear      Counseled on diet and exercise, d/w wegovy or saxenda, decliens for now;  Due wellness labs and will check other labs due since having drawn;  On metformin for pcos/hyperinsulinism       -Follow up: 6 months and prn  "

## 2022-07-22 LAB
ALBUMIN SERPL-MCNC: 4.2 G/DL (ref 3.8–4.8)
ALBUMIN/GLOB SERPL: 1.3 {RATIO} (ref 1.2–2.2)
ALP SERPL-CCNC: 107 IU/L (ref 44–121)
ALT SERPL-CCNC: 32 IU/L (ref 0–32)
AST SERPL-CCNC: 20 IU/L (ref 0–40)
BASOPHILS # BLD AUTO: 0.1 X10E3/UL (ref 0–0.2)
BASOPHILS NFR BLD AUTO: 1 %
BILIRUB SERPL-MCNC: 0.2 MG/DL (ref 0–1.2)
BUN SERPL-MCNC: 8 MG/DL (ref 6–20)
BUN/CREAT SERPL: 10 (ref 9–23)
C PEPTIDE SERPL-MCNC: 4.7 NG/ML (ref 1.1–4.4)
CALCIUM SERPL-MCNC: 9.4 MG/DL (ref 8.7–10.2)
CHLORIDE SERPL-SCNC: 103 MMOL/L (ref 96–106)
CHOLEST SERPL-MCNC: 181 MG/DL (ref 100–199)
CO2 SERPL-SCNC: 21 MMOL/L (ref 20–29)
CREAT SERPL-MCNC: 0.78 MG/DL (ref 0.57–1)
DHEA-S SERPL-MCNC: 309 UG/DL (ref 84.8–378)
EGFRCR SERPLBLD CKD-EPI 2021: 104 ML/MIN/1.73
EOSINOPHIL # BLD AUTO: 0.2 X10E3/UL (ref 0–0.4)
EOSINOPHIL NFR BLD AUTO: 2 %
ERYTHROCYTE [DISTWIDTH] IN BLOOD BY AUTOMATED COUNT: 14.1 % (ref 11.7–15.4)
GLOBULIN SER CALC-MCNC: 3.2 G/DL (ref 1.5–4.5)
GLUCOSE SERPL-MCNC: 97 MG/DL (ref 65–99)
HBA1C MFR BLD: 6.2 % (ref 4.8–5.6)
HCT VFR BLD AUTO: 42.2 % (ref 34–46.6)
HDLC SERPL-MCNC: 34 MG/DL
HGB BLD-MCNC: 13.7 G/DL (ref 11.1–15.9)
IMM GRANULOCYTES # BLD AUTO: 0 X10E3/UL (ref 0–0.1)
IMM GRANULOCYTES NFR BLD AUTO: 0 %
INSULIN SERPL-ACNC: 29.4 UIU/ML (ref 2.6–24.9)
LDLC SERPL CALC-MCNC: 128 MG/DL (ref 0–99)
LYMPHOCYTES # BLD AUTO: 2.4 X10E3/UL (ref 0.7–3.1)
LYMPHOCYTES NFR BLD AUTO: 24 %
MCH RBC QN AUTO: 26.2 PG (ref 26.6–33)
MCHC RBC AUTO-ENTMCNC: 32.5 G/DL (ref 31.5–35.7)
MCV RBC AUTO: 81 FL (ref 79–97)
MONOCYTES # BLD AUTO: 0.7 X10E3/UL (ref 0.1–0.9)
MONOCYTES NFR BLD AUTO: 7 %
NEUTROPHILS # BLD AUTO: 6.6 X10E3/UL (ref 1.4–7)
NEUTROPHILS NFR BLD AUTO: 66 %
PATH INTERP BLD-IMP: ABNORMAL
PATH REV BLD -IMP: ABNORMAL
PATHOLOGIST NAME: ABNORMAL
PLATELET # BLD AUTO: 356 X10E3/UL (ref 150–450)
POTASSIUM SERPL-SCNC: 4.4 MMOL/L (ref 3.5–5.2)
PROT SERPL-MCNC: 7.4 G/DL (ref 6–8.5)
RBC # BLD AUTO: 5.23 X10E6/UL (ref 3.77–5.28)
SODIUM SERPL-SCNC: 139 MMOL/L (ref 134–144)
TESTOST FREE SERPL-MCNC: 3.1 PG/ML (ref 0–4.2)
TESTOST SERPL-MCNC: 56 NG/DL (ref 8–60)
TRIGL SERPL-MCNC: 105 MG/DL (ref 0–149)
VLDLC SERPL CALC-MCNC: 19 MG/DL (ref 5–40)
WBC # BLD AUTO: 10 X10E3/UL (ref 3.4–10.8)

## 2022-08-12 ENCOUNTER — HOSPITAL ENCOUNTER (OUTPATIENT)
Dept: SLEEP MEDICINE | Facility: HOSPITAL | Age: 32
Discharge: HOME | End: 2022-08-12
Admitting: INTERNAL MEDICINE

## 2022-08-12 DIAGNOSIS — G47.30 HYPERSOMNIA WITH SLEEP APNEA: ICD-10-CM

## 2022-08-12 DIAGNOSIS — G47.10 HYPERSOMNIA WITH SLEEP APNEA: ICD-10-CM

## 2022-08-12 PROCEDURE — 95806 SLEEP STUDY UNATT&RESP EFFT: CPT

## 2022-08-12 PROCEDURE — 95806 SLEEP STUDY UNATT&RESP EFFT: CPT | Performed by: INTERNAL MEDICINE

## 2022-08-22 ENCOUNTER — TELEPHONE (OUTPATIENT)
Dept: SLEEP MEDICINE | Facility: HOSPITAL | Age: 32
End: 2022-08-22

## 2022-08-26 ENCOUNTER — TELEPHONE (OUTPATIENT)
Dept: FAMILY MEDICINE CLINIC | Facility: CLINIC | Age: 32
End: 2022-08-26

## 2022-08-26 NOTE — TELEPHONE ENCOUNTER
Caller: Jose Alfredo Aaron    Relationship to patient: Self    Best call back number: [phone]    Chief complaint: RT SIDE OF NECK IS PAINFUL / JAW PAIN AND DOWN UNDER EAR     Type of visit: OFFICE VISIT    Requested date: 8/26    If rescheduling, when is the original appointment: N/A    Additional notes:PLEASE CALL. PATIENT WOULD LIKE TO BE WORKED IN TODAY.

## 2022-08-26 NOTE — TELEPHONE ENCOUNTER
Pt is aware she needs to go to [facility] to be looked at today since we don't have any appts available.

## 2022-10-08 DIAGNOSIS — E88.81 INSULIN RESISTANCE: ICD-10-CM

## 2022-10-08 DIAGNOSIS — E28.2 PCOS (POLYCYSTIC OVARIAN SYNDROME): ICD-10-CM

## 2022-10-10 RX ORDER — METFORMIN HYDROCHLORIDE 500 MG/1
TABLET, EXTENDED RELEASE ORAL
Qty: 90 TABLET | Refills: 1 | Status: SHIPPED | OUTPATIENT
Start: 2022-10-10 | End: 2023-03-13

## 2022-11-03 ENCOUNTER — TELEPHONE (OUTPATIENT)
Dept: SLEEP MEDICINE | Facility: HOSPITAL | Age: 32
End: 2022-11-03

## 2022-11-03 NOTE — TELEPHONE ENCOUNTER
SW/DAVE Discharge Plan  Informed patient is ready for discharge. Patient’s discharge destination is home with [facility]. Patient to be picked up by her .  Patient and  have been counseled for post hospitalization care. Initial implementation of the patient’s discharge plan has been arranged, including any devices/equipment needed for discharge. Discharge plan communicated to MD, RN, DAVE and SIOBHAN.    Readmission risk score=25%  High Risk follow up appointment not made, all follow up will be with [facility].    The patient performed a home sleep study 8/12/2022.  Obstructive sleep apnea identified with AHI 13.8 events per hour.  This suggests mild BEN; however, probably underestimated.  Based on the patient's symptoms, it is recommended auto CPAP be prescribed.  I received information from the DME that they tried to call the patient 8/24, 9/20, 9/28 and 10/11/2022.  They drove by the patient's home on 10/19/2022.  All unsuccessful.    Therefore, will try to set up follow-up appointment with patient.

## 2023-01-08 DIAGNOSIS — J30.1 SEASONAL ALLERGIC RHINITIS DUE TO POLLEN: ICD-10-CM

## 2023-01-09 RX ORDER — MONTELUKAST SODIUM 10 MG/1
TABLET ORAL
Qty: 90 TABLET | Refills: 3 | Status: SHIPPED | OUTPATIENT
Start: 2023-01-09

## 2023-01-25 ENCOUNTER — TELEPHONE (OUTPATIENT)
Dept: OBSTETRICS AND GYNECOLOGY | Facility: CLINIC | Age: 33
End: 2023-01-25

## 2023-01-25 NOTE — TELEPHONE ENCOUNTER
"    Caller: Jose Alfredo Aaron    Relationship to patient: Self    Best call back number: [phone]    Chief complaint: HAS CYST ON PANTYLINE THAT NEEDS TO BE DRAINED PER PT PER JANIE. JANIE TOLD PT TO LET HER KNOW SO THAT SHE CAN \"WORK HER IN\"  CAN CALL ANYTIME AND CAN LVM IF NA.      Type of visit: GYN F/U    Requested date: ASAP     If rescheduling, when is the original appointment: 2-6 (JANIE'S EARLIEST APPT)    Additional notes:CAN CALL  IF NA AT HER #        DELETE AFTER READING TO PATIENT: “Thank you for sharing this information with me. I will send a message to the scheduling team. Please allow 48 hours for the  staff to follow up on this request.”  "

## 2023-01-25 NOTE — TELEPHONE ENCOUNTER
Please work her in to be seen today at [place]. If she doesn't want to come to [place] I can see her tomorrow at [place]. Thank you

## 2023-01-26 ENCOUNTER — OFFICE VISIT (OUTPATIENT)
Dept: OBSTETRICS AND GYNECOLOGY | Facility: CLINIC | Age: 33
End: 2023-01-26
Payer: COMMERCIAL

## 2023-01-26 VITALS
DIASTOLIC BLOOD PRESSURE: 81 MMHG | SYSTOLIC BLOOD PRESSURE: 129 MMHG | BODY MASS INDEX: 57.52 KG/M2 | WEIGHT: 293 LBS | HEART RATE: 93 BPM | HEIGHT: 60 IN

## 2023-01-26 DIAGNOSIS — N89.8 VAGINAL LESION: Primary | ICD-10-CM

## 2023-01-26 DIAGNOSIS — N76.4 LEFT GENITAL LABIAL ABSCESS: ICD-10-CM

## 2023-01-26 PROCEDURE — 56405 I&D VULVA/PERINEAL ABSCESS: CPT | Performed by: NURSE PRACTITIONER

## 2023-01-26 RX ORDER — SULFAMETHOXAZOLE AND TRIMETHOPRIM 800; 160 MG/1; MG/1
1 TABLET ORAL 2 TIMES DAILY
Qty: 14 TABLET | Refills: 0 | Status: SHIPPED | OUTPATIENT
Start: 2023-01-26 | End: 2023-02-02

## 2023-01-26 NOTE — PROGRESS NOTES
"Chief Complaint   Patient presents with   • Follow-up     Pt here for f/u of vaginal lesion. Last seen 22.         SUBJECTIVE:     Jose Alfredo Aaron is a 32 y.o.  who presents with c/o vaginal lesion x 1 week. She reports some blood tinged drainage, but no improvement in size. She is treating with warm tub soaks which she has found helpful for the pain. Also taking tylenol. Unable to take motrin due to side effects. Denies fevers, chills, N&V, body aches.  This is a new problem. LMP 23. She has been seen for this in the past, however lesion was already draining, I&D was not indicated at that time    Past Medical History:   Diagnosis Date   • Asthma    • Headache    • Kidney stone    • Low back pain       History reviewed. No pertinent surgical history.     Review of Systems   Constitutional: Negative for chills, fatigue and fever.   Genitourinary: Negative for dysuria, frequency, menstrual problem, pelvic pain, urgency, vaginal bleeding, vaginal discharge and vaginal pain.        +left labial lesion       OBJECTIVE:   Vitals:    23 1329   BP: 129/81   Pulse: 93   Weight: 134 kg (295 lb 9.6 oz)   Height: 152.4 cm (60\")        Physical Exam  Constitutional:       General: She is not in acute distress.     Appearance: Normal appearance. She is not ill-appearing, toxic-appearing or diaphoretic.   Genitourinary:      Right Labia: No rash, tenderness, lesions, skin changes or Bartholin's cyst.     Left Labia: tenderness and lesions.      Left Labia: No skin changes, Bartholin's cyst or rash.     No labial fusion noted.            No inguinal adenopathy present in the right or left side.  Cardiovascular:      Rate and Rhythm: Normal rate.   Pulmonary:      Effort: Pulmonary effort is normal.   Abdominal:      General: There is no distension.      Palpations: Abdomen is soft. There is no mass.      Tenderness: There is no abdominal tenderness. There is no guarding.      Hernia: No hernia is present. There is " Patient Seen in: [facility] Emergency Department      History   Patient presents with:  Hypotension    Stated Complaint: hypotensive    Subjective:   HPI    Patient is a poor historian given poor clinical condition. History obtained from daughter. Social History    Tobacco Use      Smoking status: Former Smoker        Packs/day: 0.50        Years: 25.00        Pack years: 12.5        Quit date:         Years since quittin.9      Smokeless tobacco: Never Used      Tobacco comment: 22 y no hernia in the left inguinal area or right inguinal area.   Musculoskeletal:         General: Normal range of motion.      Cervical back: Normal range of motion.   Lymphadenopathy:      Lower Body: No right inguinal adenopathy. No left inguinal adenopathy.   Neurological:      General: No focal deficit present.      Mental Status: She is alert and oriented to person, place, and time.      Cranial Nerves: No cranial nerve deficit.   Skin:     General: Skin is warm and dry.   Psychiatric:         Mood and Affect: Mood normal.         Behavior: Behavior normal.         Thought Content: Thought content normal.         Judgment: Judgment normal.   Vitals and nursing note reviewed.     Procedure: Incision & Drainage of left labial abscess     Verbal consent was received from the patient  The skin was cleansed with betadine solution  Incision made directly over the center of the abscess with a No 10 blade  Pressure was applied to surrounding tissues; small amounts of purulent and blood tinged material was expressed  A culture was obtained  A blunt dissection with a curved hemostat was performed to break up loculations  This somewhat decreased the size of the abscess  The abscess was irrigated with Normal Saline solution  A dressing was applied  The patient tolerated the procedure well    Assessment/Plan    Diagnoses and all orders for this visit:    1. Vaginal lesion (Primary)  -     sulfamethoxazole-trimethoprim (Bactrim DS) 800-160 MG per tablet; Take 1 tablet by mouth 2 (Two) Times a Day for 7 days.  Dispense: 14 tablet; Refill: 0  -     Culture, Routine - Swab, Labia; Future  -     Culture, Routine - Swab, Labia    2. Left genital labial abscess    I&D performed, pt tolerated well  Following I&D there is still a significant amount of induration noted, approx 1.5 cm for this reason will begin antibiotics  Reviewed wound care instructions  Continue sitz bath, tylenol PRN pain  Advised to call of go to ED with fevers,  Coordination normal.   Psychiatric:         Behavior: Behavior normal.              ED Course     Labs Reviewed   COMP METABOLIC PANEL (14) - Abnormal; Notable for the following components:       Result Value    Glucose 149 (*)     Sodium 129 (*)     [place] chills, body aches, worsening pain    Follow up: 1 week    I spent 45 minutes caring for Jose Alfredo on this date of service. This time includes time spent by me in the following activities: preparing for the visit, reviewing tests, obtaining and/or reviewing a separately obtained history, performing a medically appropriate examination and/or evaluation, counseling and educating the patient/family/caregiver, ordering medications, tests, or procedures, referring and communicating with other health care professionals and documenting information in the medical record    Charlene Ford, WARNER  1/26/2023  18:26 EST   Final result                 Please view results for these tests on the individual orders. URINALYSIS WITH CULTURE REFLEX   LACTIC ACID 3 HR POST POSITIVE   TYPE AND SCREEN    Narrative:      The following orders were created for panel order Type Chloride 94 (*)     CO2 19.0 (*)     BUN 86 (*)     Creatinine 3.48 (*)     Calcium, Total 8.3 (*)     Calculated Osmolality 297 (*)     GFR, Non- 12 (*)     GFR, -American 14 (*)     Alkaline Phosphatase 333 (*)     Albumin 1.8 (*) screen.   Procedure                               Abnormality         Status                     ---------                               -----------         ------                     AB[facility] ([facility])[[id number]]                               Final result family, and clinical staff.                  Disposition and Plan     Clinical Impression:  Sepsis due to undetermined organism ([facility] [address]  (primary encounter diagnosis)  DEEP (acute kidney injury) ([facility] [address]  Hyponatremia  Lactic acid acidosis     Disposition:  There

## 2023-01-31 LAB
BACTERIA SPEC CULT: NORMAL
MICROORGANISM/AGENT SPEC: NORMAL

## 2023-02-06 ENCOUNTER — OFFICE VISIT (OUTPATIENT)
Dept: OBSTETRICS AND GYNECOLOGY | Facility: CLINIC | Age: 33
End: 2023-02-06
Payer: COMMERCIAL

## 2023-02-06 VITALS
DIASTOLIC BLOOD PRESSURE: 78 MMHG | HEIGHT: 61 IN | BODY MASS INDEX: 55.32 KG/M2 | SYSTOLIC BLOOD PRESSURE: 126 MMHG | WEIGHT: 293 LBS

## 2023-02-06 DIAGNOSIS — N76.4 LABIAL ABSCESS: Primary | ICD-10-CM

## 2023-02-06 PROCEDURE — 99213 OFFICE O/P EST LOW 20 MIN: CPT | Performed by: NURSE PRACTITIONER

## 2023-02-06 NOTE — PROGRESS NOTES
"Chief Complaint   Patient presents with   • Follow-up     Pt here for f/u. I&D of left labial abscess 23.         SUBJECTIVE:     Jose Alfredo Aaron is a 33 y.o.  who presents to f/u left labial abscess. She was last seen 23 for I&D and was started on antibiotics. Cultures returned with mixed skin dylon. She reports complete resolution of lesion.     Past Medical History:   Diagnosis Date   • Asthma    • Headache    • Kidney stone    • Low back pain       History reviewed. No pertinent surgical history.     OBJECTIVE:   Vitals:    23 1547   BP: 126/78   Weight: 135 kg (297 lb 3.2 oz)   Height: 154.9 cm (61\")        Physical Exam  Constitutional:       General: She is not in acute distress.     Appearance: Normal appearance. She is not ill-appearing, toxic-appearing or diaphoretic.   Genitourinary:      Right Labia: No rash, tenderness, lesions, skin changes or Bartholin's cyst.     Left Labia: No tenderness, lesions, skin changes, Bartholin's cyst or rash.     No labial fusion noted.      No inguinal adenopathy present in the right or left side.  Abdominal:      Hernia: There is no hernia in the left inguinal area or right inguinal area.   Lymphadenopathy:      Lower Body: No right inguinal adenopathy. No left inguinal adenopathy.   Neurological:      Mental Status: She is alert.   Vitals and nursing note reviewed.         Assessment/Plan    Diagnoses and all orders for this visit:    1. Labial abscess (Primary)    Left labial abscess is completely healed  Reviewed cultures-normal skin dylon  Discussed possible causes  Call if symptoms return    Follow up: PRN and annually     I spent 20 minutes caring for Jose Alfredo on this date of service. This time includes time spent by me in the following activities: preparing for the visit, reviewing tests, obtaining and/or reviewing a separately obtained history, performing a medically appropriate examination and/or evaluation, counseling and educating the " patient/family/caregiver, ordering medications, tests, or procedures, referring and communicating with other health care professionals and documenting information in the medical record    WARNER Simmons  2/6/2023  16:27 EST

## 2023-03-13 ENCOUNTER — OFFICE VISIT (OUTPATIENT)
Dept: FAMILY MEDICINE CLINIC | Facility: CLINIC | Age: 33
End: 2023-03-13
Payer: COMMERCIAL

## 2023-03-13 VITALS
HEIGHT: 61 IN | SYSTOLIC BLOOD PRESSURE: 116 MMHG | HEART RATE: 100 BPM | WEIGHT: 293 LBS | DIASTOLIC BLOOD PRESSURE: 68 MMHG | OXYGEN SATURATION: 98 % | BODY MASS INDEX: 55.32 KG/M2

## 2023-03-13 DIAGNOSIS — E88.81 INSULIN RESISTANCE: ICD-10-CM

## 2023-03-13 DIAGNOSIS — E28.2 PCOS (POLYCYSTIC OVARIAN SYNDROME): Primary | ICD-10-CM

## 2023-03-13 DIAGNOSIS — E66.01 MORBID (SEVERE) OBESITY DUE TO EXCESS CALORIES: ICD-10-CM

## 2023-03-13 DIAGNOSIS — I78.1 CAPILLARY HEMANGIOMA: ICD-10-CM

## 2023-03-13 DIAGNOSIS — E78.5 DYSLIPIDEMIA: ICD-10-CM

## 2023-03-13 DIAGNOSIS — R53.83 OTHER FATIGUE: ICD-10-CM

## 2023-03-13 DIAGNOSIS — D69.6 THROMBOCYTOPENIA: ICD-10-CM

## 2023-03-13 DIAGNOSIS — R73.03 PREDIABETES: ICD-10-CM

## 2023-03-13 DIAGNOSIS — E55.9 VITAMIN D DEFICIENCY: ICD-10-CM

## 2023-03-13 DIAGNOSIS — N92.6 IRREGULAR PERIODS/MENSTRUAL CYCLES: ICD-10-CM

## 2023-03-13 DIAGNOSIS — L68.0 HIRSUTISM: ICD-10-CM

## 2023-03-13 RX ORDER — SEMAGLUTIDE 0.5 MG/.5ML
0.5 INJECTION, SOLUTION SUBCUTANEOUS WEEKLY
Qty: 2 ML | Refills: 0 | Status: SHIPPED | OUTPATIENT
Start: 2023-03-13 | End: 2023-03-14

## 2023-03-13 RX ORDER — SEMAGLUTIDE 1 MG/.5ML
1 INJECTION, SOLUTION SUBCUTANEOUS WEEKLY
Qty: 2 ML | Refills: 0 | Status: SHIPPED | OUTPATIENT
Start: 2023-03-13 | End: 2023-03-14

## 2023-03-13 RX ORDER — SEMAGLUTIDE 1.7 MG/.75ML
1.7 INJECTION, SOLUTION SUBCUTANEOUS WEEKLY
Qty: 2 ML | Refills: 0 | Status: SHIPPED | OUTPATIENT
Start: 2023-03-13 | End: 2023-03-14

## 2023-03-13 RX ORDER — SEMAGLUTIDE 2.4 MG/.75ML
2.4 INJECTION, SOLUTION SUBCUTANEOUS WEEKLY
Qty: 6 ML | Refills: 2 | Status: SHIPPED | OUTPATIENT
Start: 2023-03-13 | End: 2023-03-14

## 2023-03-13 NOTE — PROGRESS NOTES
"Tala Aaron is a 33 y.o. female. Presents today for   Chief Complaint   Patient presents with   • Follow-up   • Menstrual Problem       History of Present Illness  Patient 32 y/o with irregular periods, unwanted hair growth, acne and struggling to lose weight;  She has insulin resistance and pcos;   No cp/soa;  No abd pain;  Is strugglign with fatigue and losing weight despite diet.    Review of Systems   Constitutional: Positive for fatigue.   Respiratory: Negative for shortness of breath.    Cardiovascular: Negative for chest pain and palpitations.   Gastrointestinal: Negative for abdominal pain.   Genitourinary: Positive for menstrual problem.       Patient Active Problem List   Diagnosis   • Mild intermittent asthma with status asthmaticus   • Hypersomnia with sleep apnea   • Class 3 severe obesity due to excess calories with serious comorbidity and body mass index (BMI) of 50.0 to 59.9 in adult (HCC)       Social History     Socioeconomic History   • Marital status:    Tobacco Use   • Smoking status: Never   • Smokeless tobacco: Never   Vaping Use   • Vaping Use: Never used   Substance and Sexual Activity   • Alcohol use: No   • Drug use: No   • Sexual activity: Yes     Partners: Male     Birth control/protection: None       Allergies   Allergen Reactions   • Aspirin Hives and Swelling       Current Outpatient Medications on File Prior to Visit   Medication Sig Dispense Refill   • Advair -21 MCG/ACT inhaler INHALE TWO PUFFS BY MOUTH TWICE A DAY 12 g 11   • montelukast (SINGULAIR) 10 MG tablet TAKE ONE TABLET BY MOUTH ONCE NIGHTLY 90 tablet 3   • albuterol sulfate HFA (ProAir HFA) 108 (90 Base) MCG/ACT inhaler Inhale 2 puffs Every 4 (Four) Hours As Needed for Wheezing. 8 g 11     No current facility-administered medications on file prior to visit.       Objective   Vitals:    03/13/23 1651   BP: 116/68   Pulse: 100   SpO2: 98%   Weight: 135 kg (297 lb)   Height: 154.9 cm (60.98\") "     Body mass index is 56.15 kg/m².    Physical Exam  Vitals and nursing note reviewed.   Constitutional:       Appearance: She is well-developed.   Skin:     General: Skin is warm and dry.      Comments: Dark lesion, ? hemagioma   Neurological:      Mental Status: She is alert and oriented to person, place, and time.   Psychiatric:         Behavior: Behavior normal.         Assessment & Plan   Diagnoses and all orders for this visit:    1. PCOS (polycystic ovarian syndrome) (Primary)  -     Discontinue: Semaglutide-Weight Management (Wegovy) 0.5 MG/0.5ML solution auto-injector; Inject 0.5 mL under the skin into the appropriate area as directed 1 (One) Time Per Week. X 4 weeks then go to 1mg if tolerating  Dispense: 2 mL; Refill: 0  -     Discontinue: Semaglutide-Weight Management (Wegovy) 1 MG/0.5ML solution auto-injector; Inject 1 mg under the skin into the appropriate area as directed 1 (One) Time Per Week. X 4 weeks then go to 1.7mg  Dispense: 2 mL; Refill: 0  -     Discontinue: Semaglutide-Weight Management (Wegovy) 1.7 MG/0.75ML solution auto-injector; Inject 0.75 mL under the skin into the appropriate area as directed 1 (One) Time Per Week. X 4 weeks then go to 2.4mg if tolerates  Dispense: 2 mL; Refill: 0  -     Discontinue: Semaglutide-Weight Management (Wegovy) 2.4 MG/0.75ML solution auto-injector; Inject 2.4 mg under the skin into the appropriate area as directed 1 (One) Time Per Week.  Dispense: 6 mL; Refill: 2  -     Estrogens, Total  -     Testosterone  -     DHEA-Sulfate    2. Morbid (severe) obesity due to excess calories (HCC)  -     Discontinue: Semaglutide-Weight Management (Wegovy) 0.5 MG/0.5ML solution auto-injector; Inject 0.5 mL under the skin into the appropriate area as directed 1 (One) Time Per Week. X 4 weeks then go to 1mg if tolerating  Dispense: 2 mL; Refill: 0  -     Discontinue: Semaglutide-Weight Management (Wegovy) 1 MG/0.5ML solution auto-injector; Inject 1 mg under the skin into  the appropriate area as directed 1 (One) Time Per Week. X 4 weeks then go to 1.7mg  Dispense: 2 mL; Refill: 0  -     Discontinue: Semaglutide-Weight Management (Wegovy) 1.7 MG/0.75ML solution auto-injector; Inject 0.75 mL under the skin into the appropriate area as directed 1 (One) Time Per Week. X 4 weeks then go to 2.4mg if tolerates  Dispense: 2 mL; Refill: 0  -     Discontinue: Semaglutide-Weight Management (Wegovy) 2.4 MG/0.75ML solution auto-injector; Inject 2.4 mg under the skin into the appropriate area as directed 1 (One) Time Per Week.  Dispense: 6 mL; Refill: 2    3. Prediabetes  -     Discontinue: Semaglutide-Weight Management (Wegovy) 0.5 MG/0.5ML solution auto-injector; Inject 0.5 mL under the skin into the appropriate area as directed 1 (One) Time Per Week. X 4 weeks then go to 1mg if tolerating  Dispense: 2 mL; Refill: 0  -     Discontinue: Semaglutide-Weight Management (Wegovy) 1 MG/0.5ML solution auto-injector; Inject 1 mg under the skin into the appropriate area as directed 1 (One) Time Per Week. X 4 weeks then go to 1.7mg  Dispense: 2 mL; Refill: 0  -     Discontinue: Semaglutide-Weight Management (Wegovy) 1.7 MG/0.75ML solution auto-injector; Inject 0.75 mL under the skin into the appropriate area as directed 1 (One) Time Per Week. X 4 weeks then go to 2.4mg if tolerates  Dispense: 2 mL; Refill: 0  -     Discontinue: Semaglutide-Weight Management (Wegovy) 2.4 MG/0.75ML solution auto-injector; Inject 2.4 mg under the skin into the appropriate area as directed 1 (One) Time Per Week.  Dispense: 6 mL; Refill: 2  -     Comprehensive Metabolic Panel  -     Lipid Panel  -     Hemoglobin A1c    4. Insulin resistance  -     C-Peptide  -     Insulin, Total    5. Dyslipidemia  -     Comprehensive Metabolic Panel  -     Lipid Panel    6. Other fatigue  -     Comprehensive Metabolic Panel  -     TSH    7. Thrombocytopenia (HCC)  -     CBC & Differential    8. Hirsutism    9. Irregular periods/menstrual  cycles  -     Comprehensive Metabolic Panel  -     Estrogens, Total  -     Testosterone  -     DHEA-Sulfate    10. Vitamin D deficiency  -     Vitamin D,25-Hydroxy    11. Capillary hemangioma  -     Ambulatory Referral to Dermatology        Counseled on diet and exercise; will try injectable as can help with insulin resistance and reduce a1c  Due check cbc as low plt  Check pcos labs  Refer to derm  Check labs for fatigue  Due check lipids  Gave discount card ozempic, but samples wegovy;         -Follow up: 3 months and prn

## 2023-03-14 DIAGNOSIS — E66.01 MORBID (SEVERE) OBESITY DUE TO EXCESS CALORIES: ICD-10-CM

## 2023-03-14 DIAGNOSIS — E28.2 PCOS (POLYCYSTIC OVARIAN SYNDROME): ICD-10-CM

## 2023-03-14 DIAGNOSIS — E78.5 DYSLIPIDEMIA: ICD-10-CM

## 2023-03-14 DIAGNOSIS — R73.03 PREDIABETES: Primary | ICD-10-CM

## 2023-03-14 DIAGNOSIS — E88.81 INSULIN RESISTANCE: ICD-10-CM

## 2023-03-14 RX ORDER — SEMAGLUTIDE 1.34 MG/ML
INJECTION, SOLUTION SUBCUTANEOUS
Qty: 1.5 ML | Refills: 0 | Status: SHIPPED | OUTPATIENT
Start: 2023-03-14 | End: 2023-03-15 | Stop reason: SDUPTHER

## 2023-03-15 ENCOUNTER — PATIENT ROUNDING (BHMG ONLY) (OUTPATIENT)
Dept: FAMILY MEDICINE CLINIC | Facility: CLINIC | Age: 33
End: 2023-03-15
Payer: COMMERCIAL

## 2023-03-15 DIAGNOSIS — R73.03 PREDIABETES: ICD-10-CM

## 2023-03-15 DIAGNOSIS — E66.01 MORBID (SEVERE) OBESITY DUE TO EXCESS CALORIES: ICD-10-CM

## 2023-03-15 DIAGNOSIS — E28.2 PCOS (POLYCYSTIC OVARIAN SYNDROME): ICD-10-CM

## 2023-03-15 DIAGNOSIS — E88.81 INSULIN RESISTANCE: ICD-10-CM

## 2023-03-15 DIAGNOSIS — E78.5 DYSLIPIDEMIA: ICD-10-CM

## 2023-03-15 RX ORDER — SEMAGLUTIDE 1.34 MG/ML
INJECTION, SOLUTION SUBCUTANEOUS
Qty: 1.5 ML | Refills: 0 | Status: SHIPPED | OUTPATIENT
Start: 2023-03-15 | End: 2023-04-26

## 2023-03-15 NOTE — PROGRESS NOTES
A [employer] message has been sent to the patient for [place] [facility] [place]

## 2023-03-18 LAB
25(OH)D3+25(OH)D2 SERPL-MCNC: 9.3 NG/ML (ref 30–100)
ALBUMIN SERPL-MCNC: 4.2 G/DL (ref 3.8–4.8)
ALBUMIN/GLOB SERPL: 1.3 {RATIO} (ref 1.2–2.2)
ALP SERPL-CCNC: 109 IU/L (ref 44–121)
ALT SERPL-CCNC: 22 IU/L (ref 0–32)
AST SERPL-CCNC: 14 IU/L (ref 0–40)
BASOPHILS # BLD AUTO: 0.1 X10E3/UL (ref 0–0.2)
BASOPHILS NFR BLD AUTO: 1 %
BILIRUB SERPL-MCNC: 0.3 MG/DL (ref 0–1.2)
BUN SERPL-MCNC: 11 MG/DL (ref 6–20)
BUN/CREAT SERPL: 13 (ref 9–23)
C PEPTIDE SERPL-MCNC: 3.8 NG/ML (ref 1.1–4.4)
CALCIUM SERPL-MCNC: 9.3 MG/DL (ref 8.7–10.2)
CHLORIDE SERPL-SCNC: 105 MMOL/L (ref 96–106)
CHOLEST SERPL-MCNC: 170 MG/DL (ref 100–199)
CO2 SERPL-SCNC: 21 MMOL/L (ref 20–29)
CREAT SERPL-MCNC: 0.85 MG/DL (ref 0.57–1)
DHEA-S SERPL-MCNC: 237 UG/DL (ref 84.8–378)
EGFRCR SERPLBLD CKD-EPI 2021: 93 ML/MIN/1.73
EOSINOPHIL # BLD AUTO: 0.2 X10E3/UL (ref 0–0.4)
EOSINOPHIL NFR BLD AUTO: 2 %
ERYTHROCYTE [DISTWIDTH] IN BLOOD BY AUTOMATED COUNT: 13.9 % (ref 11.7–15.4)
ESTROGEN SERPL-MCNC: 226 PG/ML
GLOBULIN SER CALC-MCNC: 3.2 G/DL (ref 1.5–4.5)
GLUCOSE SERPL-MCNC: 89 MG/DL (ref 70–99)
HBA1C MFR BLD: 6.1 % (ref 4.8–5.6)
HCT VFR BLD AUTO: 38.9 % (ref 34–46.6)
HDLC SERPL-MCNC: 30 MG/DL
HGB BLD-MCNC: 12.8 G/DL (ref 11.1–15.9)
IMM GRANULOCYTES # BLD AUTO: 0 X10E3/UL (ref 0–0.1)
IMM GRANULOCYTES NFR BLD AUTO: 0 %
INSULIN SERPL-ACNC: 22.2 UIU/ML (ref 2.6–24.9)
LDLC SERPL CALC-MCNC: 122 MG/DL (ref 0–99)
LYMPHOCYTES # BLD AUTO: 2.2 X10E3/UL (ref 0.7–3.1)
LYMPHOCYTES NFR BLD AUTO: 24 %
MCH RBC QN AUTO: 25.6 PG (ref 26.6–33)
MCHC RBC AUTO-ENTMCNC: 32.9 G/DL (ref 31.5–35.7)
MCV RBC AUTO: 78 FL (ref 79–97)
MONOCYTES # BLD AUTO: 0.7 X10E3/UL (ref 0.1–0.9)
MONOCYTES NFR BLD AUTO: 8 %
NEUTROPHILS # BLD AUTO: 6 X10E3/UL (ref 1.4–7)
NEUTROPHILS NFR BLD AUTO: 65 %
PLATELET # BLD AUTO: 367 X10E3/UL (ref 150–450)
POTASSIUM SERPL-SCNC: 4.2 MMOL/L (ref 3.5–5.2)
PROT SERPL-MCNC: 7.4 G/DL (ref 6–8.5)
RBC # BLD AUTO: 5 X10E6/UL (ref 3.77–5.28)
SODIUM SERPL-SCNC: 140 MMOL/L (ref 134–144)
TESTOST SERPL-MCNC: 29 NG/DL (ref 8–60)
TRIGL SERPL-MCNC: 97 MG/DL (ref 0–149)
TSH SERPL DL<=0.005 MIU/L-ACNC: 3.95 UIU/ML (ref 0.45–4.5)
VLDLC SERPL CALC-MCNC: 18 MG/DL (ref 5–40)
WBC # BLD AUTO: 9.2 X10E3/UL (ref 3.4–10.8)

## 2023-03-26 NOTE — PROGRESS NOTES
Call results to patient.  Vitamin D low - sent vitamin D3 5000 iu po daily  Thyroid normal  Cholesterol improved, continue work on diet  Insulin resistance improved  A1C slight improvement

## 2023-03-27 NOTE — PATIENT INSTRUCTIONS
"\"6-6-6-Almost None!\"  Healthy Habits Start Early    EAT 5 OR MORE SERVINGS OF VEGETABLES AND FRUITS EVERY DAY.    Help Jose Alfredo get three vegetables and two fruits each day. Red, green, yellow, orange...encourage them to try all the colors so they can enjoy different flavors and get more vitamins.    How can I help Jose Alfredo do this?  ---------------------------------------------  -BE PATIENT WITH Jose Alfredo, remember it may take 10 times before they start to like new food. So, start with small bites and just keep trying.  -Serve at least one vegetable or fruit at every meal. Even try two. Remember, portions do not have to be as big as you think.  -Encourage eating fruits and vegetables instead of drinking them..it's a better way to get fiber and vitamins..so limit the amount of juice to 1/2 cup per day for children 1-6 years and one cup per day for children 7-18 years of age. Try using 1/2 part water and 1/2 part juice.    Spend less than two hours per day watching television and other screen media. Screen media includes video games, movies and computer use for entertainment.    How can I help Jose Alfredo do this?  -Turn off the TV at dinner. Dinner is the best time to hang out with your kids and just talk, learn about their day, and tell them about your day. Your kids have a lot to learn from you and dinner is a great time to share.  "

## 2023-04-19 DIAGNOSIS — J45.901 MILD ASTHMA EXACERBATION: ICD-10-CM

## 2023-04-19 RX ORDER — METFORMIN HYDROCHLORIDE 500 MG/1
TABLET, EXTENDED RELEASE ORAL
Qty: 90 TABLET | OUTPATIENT
Start: 2023-04-19

## 2023-04-20 RX ORDER — FLUTICASONE PROPIONATE AND SALMETEROL XINAFOATE 115; 21 UG/1; UG/1
AEROSOL, METERED RESPIRATORY (INHALATION)
Qty: 12 G | Refills: 11 | Status: SHIPPED | OUTPATIENT
Start: 2023-04-20

## 2023-04-24 DIAGNOSIS — E55.9 VITAMIN D DEFICIENCY: Primary | ICD-10-CM

## 2023-04-27 DIAGNOSIS — R73.03 PREDIABETES: ICD-10-CM

## 2023-04-27 DIAGNOSIS — E78.5 DYSLIPIDEMIA: ICD-10-CM

## 2023-04-27 DIAGNOSIS — E88.81 INSULIN RESISTANCE: ICD-10-CM

## 2023-04-27 DIAGNOSIS — E28.2 PCOS (POLYCYSTIC OVARIAN SYNDROME): ICD-10-CM

## 2023-04-27 DIAGNOSIS — E66.01 MORBID (SEVERE) OBESITY DUE TO EXCESS CALORIES: ICD-10-CM

## 2023-04-27 RX ORDER — SEMAGLUTIDE 1.34 MG/ML
INJECTION, SOLUTION SUBCUTANEOUS
Qty: 1.5 ML | Refills: 0 | OUTPATIENT
Start: 2023-04-27

## 2023-04-27 RX ORDER — SEMAGLUTIDE 1.34 MG/ML
1 INJECTION, SOLUTION SUBCUTANEOUS WEEKLY
Qty: 3 ML | Refills: 0 | Status: SHIPPED | OUTPATIENT
Start: 2023-04-27

## 2023-05-14 NOTE — PROGRESS NOTES
"Chief Complaint   Patient presents with   • Follow-up     Pt c/o sore in vaginal area.         SUBJECTIVE:     Jose Alfredo Aaron is a 32 y.o.  who presents with c/o sore in vaginal area for approximately one month. I have previously seen this pt for abscess in genital area, I&D was not required at that time. This lesion is in a different location.  Current lesion has \"popped twice in the last month\" but has returned a 3rd time. Denies purulent material draining, she only sees blood when lesion drains and this is concerning for her. Current lesion ruptured since scheduling this appointment. She is still having mild discomfort when she is sitting down. This is a new problem. LMP 22. C/o vaginal discharge for approximately 1 week. Denies itching, odor, or pelvic pain. Denies new partners.     She is a patient of Dr. Butts.    Past Medical History:   Diagnosis Date   • Asthma    • Headache    • Kidney stone    • Low back pain       History reviewed. No pertinent surgical history.   Social History     Tobacco Use   • Smoking status: Never Smoker   • Smokeless tobacco: Never Used   Substance Use Topics   • Alcohol use: No   • Drug use: No     OB History    Para Term  AB Living   0 0 0 0 0 0   SAB IAB Ectopic Molar Multiple Live Births   0 0 0 0 0 0   Obstetric Comments   Would like to conceive        Review of Systems   Constitutional: Negative for chills, fatigue and fever.   Gastrointestinal: Negative for abdominal distention, abdominal pain, nausea and vomiting.   Genitourinary: Positive for genital sores and vaginal discharge. Negative for dyspareunia, dysuria, frequency, menstrual problem, pelvic pain, urgency, vaginal bleeding and vaginal pain.        -vaginal odor  -vaginal itching   Musculoskeletal: Negative for gait problem.   Skin: Negative for rash.       OBJECTIVE:   Vitals:    22 0915   BP: 148/80   Weight: (!) 140 kg (309 lb)   Height: 154.9 cm (61\")        Physical " Exam  Constitutional:       General: She is not in acute distress.     Appearance: Normal appearance. She is obese. She is not ill-appearing, toxic-appearing or diaphoretic.   Genitourinary:      Bladder and urethral meatus normal.      No lesions in the vagina.      Right Labia: No rash, tenderness, lesions, skin changes or Bartholin's cyst.     Left Labia: lesions.      Left Labia: No tenderness, skin changes, Bartholin's cyst or rash.     No labial fusion noted.            No inguinal adenopathy present in the right or left side.     Vaginal discharge (scant white) present.      No vaginal erythema, tenderness, bleeding, ulceration or granulation tissue.      No vaginal prolapse present.     No vaginal atrophy present.       Right Adnexa: not tender, not full, not palpable, no mass present and not absent.     Left Adnexa: not tender, not full, not palpable, no mass present and not absent.     No cervical motion tenderness, discharge, friability, lesion, polyp, nabothian cyst or eversion.      Uterus is not enlarged, fixed, tender, irregular or prolapsed.      No uterine mass detected.  Cardiovascular:      Rate and Rhythm: Normal rate.      Pulses: Normal pulses.   Pulmonary:      Effort: Pulmonary effort is normal.   Abdominal:      General: There is no distension.      Palpations: Abdomen is soft. There is no mass.      Tenderness: There is no abdominal tenderness. There is no guarding.      Hernia: No hernia is present. There is no hernia in the left inguinal area or right inguinal area.   Musculoskeletal:         General: Normal range of motion.      Cervical back: Normal range of motion.   Lymphadenopathy:      Lower Body: No right inguinal adenopathy. No left inguinal adenopathy.   Neurological:      General: No focal deficit present.      Mental Status: She is alert and oriented to person, place, and time.   Skin:     General: Skin is warm and dry.   Vitals and nursing note reviewed.        Assessment/Plan    Diagnoses and all orders for this visit:    1. Vaginal lesion (Primary)  -     chlorhexidine (Hibiclens) 4 % external liquid; Apply  topically to the appropriate area as directed Daily As Needed for Wound Care.  Dispense: 236 mL; Refill: 12    2. Vaginal discharge  -     NuSwab BV & Candida - Swab, Vagina    Lesion has drained, appears to be healing well.   Encouraged pt to return to the office when/if lesion returns will try to work pt in same day.   Discussed I&D indications.   Consider antibiotic treatment if lesion returns in this same area again  Encouraged BID sitz bath, keep skin clean and dry  Will trial hibiclens once daily to prevent lesion from returning.   Scant vaginal discharge noted, vaginal culture obtained. Will await results and treat if indicated    Due for AE, encouraged to schedule    Follow up: PRN and annually       Charlene Ford, APRN  2/8/2022  10:32 EST   5

## 2023-05-23 RX ORDER — SEMAGLUTIDE 1.34 MG/ML
INJECTION, SOLUTION SUBCUTANEOUS
Qty: 3 ML | Refills: 0 | Status: SHIPPED | OUTPATIENT
Start: 2023-05-23

## 2023-06-19 ENCOUNTER — OFFICE VISIT (OUTPATIENT)
Dept: FAMILY MEDICINE CLINIC | Facility: CLINIC | Age: 33
End: 2023-06-19
Payer: COMMERCIAL

## 2023-06-19 VITALS
SYSTOLIC BLOOD PRESSURE: 120 MMHG | HEIGHT: 61 IN | BODY MASS INDEX: 52.49 KG/M2 | OXYGEN SATURATION: 99 % | RESPIRATION RATE: 18 BRPM | DIASTOLIC BLOOD PRESSURE: 62 MMHG | WEIGHT: 278 LBS | TEMPERATURE: 96.9 F | HEART RATE: 110 BPM

## 2023-06-19 DIAGNOSIS — E66.01 MORBID (SEVERE) OBESITY DUE TO EXCESS CALORIES: ICD-10-CM

## 2023-06-19 DIAGNOSIS — R73.03 PREDIABETES: ICD-10-CM

## 2023-06-19 DIAGNOSIS — E28.2 PCOS (POLYCYSTIC OVARIAN SYNDROME): Primary | ICD-10-CM

## 2023-06-19 NOTE — PATIENT INSTRUCTIONS
Calorie Counting for Weight Loss  Calories are units of energy. Your body needs a certain number of calories from food to keep going throughout the day. When you eat or drink more calories than your body needs, your body stores the extra calories mostly as fat. When you eat or drink fewer calories than your body needs, your body burns fat to get the energy it needs.  Calorie counting means keeping track of how many calories you eat and drink each day. Calorie counting can be helpful if you need to lose weight. If you eat fewer calories than your body needs, you should lose weight. Ask your health care provider what a healthy weight is for you.  For calorie counting to work, you will need to eat the right number of calories each day to lose a healthy amount of weight per week. A dietitian can help you figure out how many calories you need in a day and will suggest ways to reach your calorie goal.  A healthy amount of weight to lose each week is usually 1-2 lb (0.5-0.9 kg). This usually means that your daily calorie intake should be reduced by 500-750 calories.  Eating 1,200-1,500 calories a day can help most women lose weight.  Eating 1,500-1,800 calories a day can help most men lose weight.  What do I need to know about calorie counting?  Work with your health care provider or dietitian to determine how many calories you should get each day. To meet your daily calorie goal, you will need to:  Find out how many calories are in each food that you would like to eat. Try to do this before you eat.  Decide how much of the food you plan to eat.  Keep a food log. Do this by writing down what you ate and how many calories it had.  To successfully lose weight, it is important to balance calorie counting with a healthy lifestyle that includes regular activity.  Where do I find calorie information?  The number of calories in a food can be found on a Nutrition Facts label. If a food does not have a Nutrition Facts label, try to  look up the calories online or ask your dietitian for help.  Remember that calories are listed per serving. If you choose to have more than one serving of a food, you will have to multiply the calories per serving by the number of servings you plan to eat. For example, the label on a package of bread might say that a serving size is 1 slice and that there are 90 calories in a serving. If you eat 1 slice, you will have eaten 90 calories. If you eat 2 slices, you will have eaten 180 calories.  How do I keep a food log?  After each time that you eat, record the following in your food log as soon as possible:  What you ate. Be sure to include toppings, sauces, and other extras on the food.  How much you ate. This can be measured in cups, ounces, or number of items.  How many calories were in each food and drink.  The total number of calories in the food you ate.  Keep your food log near you, such as in a pocket-sized notebook or on an ra or website on your mobile phone. Some programs will calculate calories for you and show you how many calories you have left to meet your daily goal.  What are some portion-control tips?  Know how many calories are in a serving. This will help you know how many servings you can have of a certain food.  Use a measuring cup to measure serving sizes. You could also try weighing out portions on a kitchen scale. With time, you will be able to estimate serving sizes for some foods.  Take time to put servings of different foods on your favorite plates or in your favorite bowls and cups so you know what a serving looks like.  Try not to eat straight from a food's packaging, such as from a bag or box. Eating straight from the package makes it hard to see how much you are eating and can lead to overeating. Put the amount you would like to eat in a cup or on a plate to make sure you are eating the right portion.  Use smaller plates, glasses, and bowls for smaller portions and to prevent  overeating.  Try not to multitask. For example, avoid watching TV or using your computer while eating. If it is time to eat, sit down at a table and enjoy your food. This will help you recognize when you are full. It will also help you be more mindful of what and how much you are eating.  What are tips for following this plan?  Reading food labels  Check the calorie count compared with the serving size. The serving size may be smaller than what you are used to eating.  Check the source of the calories. Try to choose foods that are high in protein, fiber, and vitamins, and low in saturated fat, trans fat, and sodium.  Shopping  Read nutrition labels while you shop. This will help you make healthy decisions about which foods to buy.  Pay attention to nutrition labels for low-fat or fat-free foods. These foods sometimes have the same number of calories or more calories than the full-fat versions. They also often have added sugar, starch, or salt to make up for flavor that was removed with the fat.  Make a grocery list of lower-calorie foods and stick to it.  Cooking  Try to cook your favorite foods in a healthier way. For example, try baking instead of frying.  Use low-fat dairy products.  Meal planning  Use more fruits and vegetables. One-half of your plate should be fruits and vegetables.  Include lean proteins, such as chicken, turkey, and fish.  Lifestyle  Each week, aim to do one of the followin minutes of moderate exercise, such as walking.  75 minutes of vigorous exercise, such as running.  General information  Know how many calories are in the foods you eat most often. This will help you calculate calorie counts faster.  Find a way of tracking calories that works for you. Get creative. Try different apps or programs if writing down calories does not work for you.  What foods should I eat?    Eat nutritious foods. It is better to have a nutritious, high-calorie food, such as an avocado, than a food with  few nutrients, such as a bag of potato chips.  Use your calories on foods and drinks that will fill you up and will not leave you hungry soon after eating.  Examples of foods that fill you up are nuts and nut butters, vegetables, lean proteins, and high-fiber foods such as whole grains. High-fiber foods are foods with more than 5 g of fiber per serving.  Pay attention to calories in drinks. Low-calorie drinks include water and unsweetened drinks.  The items listed above may not be a complete list of foods and beverages you can eat. Contact a dietitian for more information.  What foods should I limit?  Limit foods or drinks that are not good sources of vitamins, minerals, or protein or that are high in unhealthy fats. These include:  Candy.  Other sweets.  Sodas, specialty coffee drinks, alcohol, and juice.  The items listed above may not be a complete list of foods and beverages you should avoid. Contact a dietitian for more information.  How do I count calories when eating out?  Pay attention to portions. Often, portions are much larger when eating out. Try these tips to keep portions smaller:  Consider sharing a meal instead of getting your own.  If you get your own meal, eat only half of it. Before you start eating, ask for a container and put half of your meal into it.  When available, consider ordering smaller portions from the menu instead of full portions.  Pay attention to your food and drink choices. Knowing the way food is cooked and what is included with the meal can help you eat fewer calories.  If calories are listed on the menu, choose the lower-calorie options.  Choose dishes that include vegetables, fruits, whole grains, low-fat dairy products, and lean proteins.  Choose items that are boiled, broiled, grilled, or steamed. Avoid items that are buttered, battered, fried, or served with cream sauce. Items labeled as crispy are usually fried, unless stated otherwise.  Choose water, low-fat milk,  unsweetened iced tea, or other drinks without added sugar. If you want an alcoholic beverage, choose a lower-calorie option, such as a glass of wine or light beer.  Ask for dressings, sauces, and syrups on the side. These are usually high in calories, so you should limit the amount you eat.  If you want a salad, choose a garden salad and ask for grilled meats. Avoid extra toppings such as vee, cheese, or fried items. Ask for the dressing on the side, or ask for olive oil and vinegar or lemon to use as dressing.  Estimate how many servings of a food you are given. Knowing serving sizes will help you be aware of how much food you are eating at restaurants.  Where to find more information  Centers for Disease Control and Prevention: www.cdc.gov  [place] Department of Agriculture: myplate.gov  Summary  Calorie counting means keeping track of how many calories you eat and drink each day. If you eat fewer calories than your body needs, you should lose weight.  A healthy amount of weight to lose per week is usually 1-2 lb (0.5-0.9 kg). This usually means reducing your daily calorie intake by 500-750 calories.  The number of calories in a food can be found on a Nutrition Facts label. If a food does not have a Nutrition Facts label, try to look up the calories online or ask your dietitian for help.  Use smaller plates, glasses, and bowls for smaller portions and to prevent overeating.  Use your calories on foods and drinks that will fill you up and not leave you hungry shortly after a meal.  This information is not intended to replace advice given to you by your health care provider. Make sure you discuss any questions you have with your health care provider.  Document Revised: 01/28/2021 Document Reviewed: 01/28/2021  Elsevier Patient Education © 2022 [employer]    Exercising to Lose Weight  Getting regular exercise is important for everyone. It is especially important if you are overweight. Being overweight increases  your risk of heart disease, stroke, diabetes, high blood pressure, and several types of cancer. Exercising, and reducing the calories you consume, can help you lose weight and improve fitness and health.  Exercise can be moderate or vigorous intensity. To lose weight, most people need to do a certain amount of moderate or vigorous-intensity exercise each week.  How can exercise affect me?  You lose weight when you exercise enough to burn more calories than you eat. Exercise also reduces body fat and builds muscle. The more muscle you have, the more calories you burn. Exercise also:  Improves mood.  Reduces stress and tension.  Improves your overall fitness, flexibility, and endurance.  Increases bone strength.  Moderate-intensity exercise  Moderate-intensity exercise is any activity that gets you moving enough to burn at least three times more energy (calories) than if you were sitting.  Examples of moderate exercise include:  Walking a mile in 15 minutes.  Doing light yard work.  Biking at an easy pace.  Most people should get at least 150 minutes of moderate-intensity exercise a week to maintain their body weight.  Vigorous-intensity exercise  Vigorous-intensity exercise is any activity that gets you moving enough to burn at least six times more calories than if you were sitting. When you exercise at this intensity, you should be working hard enough that you are not able to carry on a conversation.  Examples of vigorous exercise include:  Running.  Playing a team sport, such as football, basketball, and soccer.  Jumping rope.  Most people should get at least 75 minutes a week of vigorous exercise to maintain their body weight.  What actions can I take to lose weight?  The amount of exercise you need to lose weight depends on:  Your age.  The type of exercise.  Any health conditions you have.  Your overall physical ability.  Talk to your health care provider about how much exercise you need and what types of  activities are safe for you.  Nutrition    Make changes to your diet as told by your health care provider or diet and nutrition specialist (dietitian). This may include:  Eating fewer calories.  Eating more protein.  Eating less unhealthy fats.  Eating a diet that includes fresh fruits and vegetables, whole grains, low-fat dairy products, and lean protein.  Avoiding foods with added fat, salt, and sugar.  Drink plenty of water while you exercise to prevent dehydration or heat stroke.  Activity  Choose an activity that you enjoy and set realistic goals. Your health care provider can help you make an exercise plan that works for you.  Exercise at a moderate or vigorous intensity most days of the week.  The intensity of exercise may vary from person to person. You can tell how intense a workout is for you by paying attention to your breathing and heartbeat. Most people will notice their breathing and heartbeat get faster with more intense exercise.  Do resistance training twice each week, such as:  Push-ups.  Sit-ups.  Lifting weights.  Using resistance bands.  Getting short amounts of exercise can be just as helpful as long, structured periods of exercise. If you have trouble finding time to exercise, try doing these things as part of your daily routine:  Get up, stretch, and walk around every 30 minutes throughout the day.  Go for a walk during your lunch break.  Park your car farther away from your destination.  If you take public transportation, get off one stop early and walk the rest of the way.  Make phone calls while standing up and walking around.  Take the stairs instead of elevators or escalators.  Wear comfortable clothes and shoes with good support.  Do not exercise so much that you hurt yourself, feel dizzy, or get very short of breath.  Where to find more information  [place] Department of Health and Human Services: www.hhs.gov  Centers for Disease Control and Prevention: www.cdc.gov  Contact a health care  provider:  Before starting a new exercise program.  If you have questions or concerns about your weight.  If you have a medical problem that keeps you from exercising.  Get help right away if:  You have any of the following while exercising:  Injury.  Dizziness.  Difficulty breathing or shortness of breath that does not go away when you stop exercising.  Chest pain.  Rapid heartbeat.  These symptoms may represent a serious problem that is an emergency. Do not wait to see if the symptoms will go away. Get medical help right away. Call your local emergency services (911 in the [place] Do not drive yourself to the hospital.  Summary  Getting regular exercise is especially important if you are overweight.  Being overweight increases your risk of heart disease, stroke, diabetes, high blood pressure, and several types of cancer.  Losing weight happens when you burn more calories than you eat.  Reducing the amount of calories you eat, and getting regular moderate or vigorous exercise each week, helps you lose weight.  This information is not intended to replace advice given to you by your health care provider. Make sure you discuss any questions you have with your health care provider.  Document Revised: 02/13/2022 Document Reviewed: 02/13/2022  Elsevier Patient Education © 2022 [employer]

## 2023-06-19 NOTE — PROGRESS NOTES
Tala Aaron is a 33 y.o. female. Presents today for   Chief Complaint   Patient presents with    Asthma    Polycystic Ovary Syndrome       History of Present Illness  Patient 34 y/o female with pcos, predm and struggling to weight loss;  no cp/soa;  no abd pain;  no side effects with 1mg dose;    Review of Systems   Respiratory:  Negative for shortness of breath.    Cardiovascular:  Negative for chest pain.   Gastrointestinal:  Negative for abdominal pain, nausea and vomiting.     Patient Active Problem List   Diagnosis    Mild intermittent asthma with status asthmaticus    Hypersomnia with sleep apnea    Class 3 severe obesity due to excess calories with serious comorbidity and body mass index (BMI) of 50.0 to 59.9 in adult       Social History     Socioeconomic History    Marital status:    Tobacco Use    Smoking status: Never    Smokeless tobacco: Never   Vaping Use    Vaping Use: Never used   Substance and Sexual Activity    Alcohol use: No    Drug use: No    Sexual activity: Yes     Partners: Male     Birth control/protection: None       Allergies   Allergen Reactions    Aspirin Hives and Swelling       Current Outpatient Medications on File Prior to Visit   Medication Sig Dispense Refill    Advair -21 MCG/ACT inhaler INHALE TWO PUFFS BY MOUTH TWICE A DAY 12 g 11    albuterol sulfate HFA (ProAir HFA) 108 (90 Base) MCG/ACT inhaler Inhale 2 puffs Every 4 (Four) Hours As Needed for Wheezing. 8 g 11    montelukast (SINGULAIR) 10 MG tablet TAKE ONE TABLET BY MOUTH ONCE NIGHTLY 90 tablet 3    Ozempic, 1 MG/DOSE, 4 MG/3ML solution pen-injector DIAL AND INJECT UNDER THE SKIN 1 MG WEEKLY 3 mL 0    vitamin D3 125 MCG (5000 UT) capsule capsule Take 1 capsule by mouth Daily. 90 capsule 1     No current facility-administered medications on file prior to visit.       Objective   Vitals:    06/19/23 1530   BP: 120/62   Pulse: 110   Resp: 18   Temp: 96.9 °F (36.1 °C)   TempSrc: Temporal   SpO2: 99%  "  Weight: 126 kg (278 lb)   Height: 154.9 cm (60.98\")   PainSc: 0-No pain     Body mass index is 52.56 kg/m².    Physical Exam  Vitals and nursing note reviewed.   Constitutional:       Appearance: Normal appearance. She is not toxic-appearing or diaphoretic.   HENT:      Head: Normocephalic and atraumatic.   Musculoskeletal:      Cervical back: Neck supple.   Skin:     General: Skin is warm and dry.      Capillary Refill: Capillary refill takes less than 2 seconds.   Neurological:      Mental Status: She is alert.   Psychiatric:         Mood and Affect: Mood normal.         Behavior: Behavior normal.     Component      Latest Ref Rng 3/14/2023   WBC      3.4 - 10.8 x10E3/uL 9.2    RBC      3.77 - 5.28 x10E6/uL 5.00    Hemoglobin      11.1 - 15.9 g/dL 12.8    Hematocrit      34.0 - 46.6 % 38.9    MCV      79 - 97 fL 78 (L)    MCH      26.6 - 33.0 pg 25.6 (L)    MCHC      31.5 - 35.7 g/dL 32.9    RDW      11.7 - 15.4 % 13.9    Platelets      150 - 450 x10E3/uL 367    Neutrophil Rel %      Not Estab. % 65    Lymphocyte Rel %      Not Estab. % 24    Monocyte Rel %      Not Estab. % 8    Eosinophil Rel %      Not Estab. % 2    Basophil Rel %      Not Estab. % 1    Neutrophils Absolute      1.4 - 7.0 x10E3/uL 6.0    Lymphocytes Absolute      0.7 - 3.1 x10E3/uL 2.2    Monocytes Absolute      0.1 - 0.9 x10E3/uL 0.7    Eosinophils Absolute      0.0 - 0.4 x10E3/uL 0.2    Basophils Absolute      0.0 - 0.2 x10E3/uL 0.1    Immature Granulocyte Rel %      Not Estab. % 0    Immature Grans, Absolute      0.0 - 0.1 x10E3/uL 0.0    Glucose      70 - 99 mg/dL 89    BUN      6 - 20 mg/dL 11    Creatinine      0.57 - 1.00 mg/dL 0.85    EGFR Result      >59 mL/min/1.73 93    BUN/Creatinine Ratio      9 - 23  13    Sodium      134 - 144 mmol/L 140    Potassium      3.5 - 5.2 mmol/L 4.2    Chloride      96 - 106 mmol/L 105    CO2      20 - 29 mmol/L 21    Calcium      8.7 - 10.2 mg/dL 9.3    Total Protein      6.0 - 8.5 g/dL 7.4  "   Albumin      3.8 - 4.8 g/dL 4.2    Globulin      1.5 - 4.5 g/dL 3.2    A/G Ratio      1.2 - 2.2  1.3    Total Bilirubin      0.0 - 1.2 mg/dL 0.3    Alkaline Phosphatase      44 - 121 IU/L 109    AST (SGOT)      0 - 40 IU/L 14    ALT (SGPT)      0 - 32 IU/L 22    Total Cholesterol      100 - 199 mg/dL 170    Triglycerides      0 - 149 mg/dL 97    HDL Cholesterol      >39 mg/dL 30 (L)    VLDL Cholesterol Fei      5 - 40 mg/dL 18    LDL Cholesterol       0 - 99 mg/dL 122 (H)    TSH Baseline      0.450 - 4.500 uIU/mL 3.950    25 Hydroxy, Vitamin D      30.0 - 100.0 ng/mL 9.3 (L)    C-Peptide      1.1 - 4.4 ng/mL 3.8    Insulin      2.6 - 24.9 uIU/mL 22.2    Estrogen      pg/mL 226    Testosterone, Total      8 - 60 ng/dL 29    DHEA-Sulfate      84.8 - 378.0 ug/dL 237.0    Hemoglobin A1C      4.8 - 5.6 % 6.1 (H)       (L) Low  (H) High  Assessment & Plan   Diagnoses and all orders for this visit:    1. PCOS (polycystic ovarian syndrome) (Primary)  -     Semaglutide, 2 MG/DOSE, (OZEMPIC) 8 MG/3ML solution pen-injector; Inject 2 mg under the skin into the appropriate area as directed 1 (One) Time Per Week.  Dispense: 3 mL; Refill: 5    2. Prediabetes  -     Semaglutide, 2 MG/DOSE, (OZEMPIC) 8 MG/3ML solution pen-injector; Inject 2 mg under the skin into the appropriate area as directed 1 (One) Time Per Week.  Dispense: 3 mL; Refill: 5    3. Morbid (severe) obesity due to excess calories  -     Semaglutide, 2 MG/DOSE, (OZEMPIC) 8 MG/3ML solution pen-injector; Inject 2 mg under the skin into the appropriate area as directed 1 (One) Time Per Week.  Dispense: 3 mL; Refill: 5    Doing well, lost nearly 20 lbs now;  will increase to 2mg dose to see if can lose more           Class 3 Severe Obesity (BMI >=40). Obesity-related health conditions include the following:  pcos, predm . Obesity is improving with treatment. BMI is is above average; BMI management plan is completed. We discussed portion control and increasing  exercise.     -Follow up: 6 months and prn

## 2023-08-20 ENCOUNTER — HOSPITAL ENCOUNTER (EMERGENCY)
Facility: HOSPITAL | Age: 33
Discharge: HOME | End: 2023-08-21
Attending: EMERGENCY MEDICINE
Payer: COMMERCIAL

## 2023-08-20 DIAGNOSIS — U07.1 COVID-19: Primary | ICD-10-CM

## 2023-08-20 DIAGNOSIS — R55 NEAR SYNCOPE: ICD-10-CM

## 2023-08-20 DIAGNOSIS — E86.0 DEHYDRATION: ICD-10-CM

## 2023-08-20 LAB
ALBUMIN SERPL-MCNC: 3.7 G/DL (ref 3.5–5.2)
ALBUMIN/GLOB SERPL: 1.1 G/DL
ALP SERPL-CCNC: 79 U/L (ref 39–117)
ALT SERPL W P-5'-P-CCNC: 23 U/L (ref 1–33)
ANION GAP SERPL CALCULATED.3IONS-SCNC: 11.6 MMOL/L (ref 5–15)
AST SERPL-CCNC: 18 U/L (ref 1–32)
BASOPHILS # BLD AUTO: 0.03 10*3/MM3 (ref 0–0.2)
BASOPHILS NFR BLD AUTO: 0.4 % (ref 0–1.5)
BILIRUB SERPL-MCNC: 0.4 MG/DL (ref 0–1.2)
BUN SERPL-MCNC: 8 MG/DL (ref 6–20)
BUN/CREAT SERPL: 7.9 (ref 7–25)
CALCIUM SPEC-SCNC: 9 MG/DL (ref 8.6–10.5)
CHLORIDE SERPL-SCNC: 105 MMOL/L (ref 98–107)
CO2 SERPL-SCNC: 21.4 MMOL/L (ref 22–29)
CREAT SERPL-MCNC: 1.01 MG/DL (ref 0.57–1)
DEPRECATED RDW RBC AUTO: 43.2 FL (ref 37–54)
EGFRCR SERPLBLD CKD-EPI 2021: 76 ML/MIN/1.73
EOSINOPHIL # BLD AUTO: 0.03 10*3/MM3 (ref 0–0.4)
EOSINOPHIL NFR BLD AUTO: 0.4 % (ref 0.3–6.2)
ERYTHROCYTE [DISTWIDTH] IN BLOOD BY AUTOMATED COUNT: 14.8 % (ref 12.3–15.4)
FLUAV SUBTYP SPEC NAA+PROBE: NOT DETECTED
FLUBV RNA ISLT QL NAA+PROBE: NOT DETECTED
GLOBULIN UR ELPH-MCNC: 3.5 GM/DL
GLUCOSE SERPL-MCNC: 92 MG/DL (ref 65–99)
HCG SERPL QL: NEGATIVE
HCT VFR BLD AUTO: 40.1 % (ref 34–46.6)
HGB BLD-MCNC: 12.7 G/DL (ref 12–15.9)
IMM GRANULOCYTES # BLD AUTO: 0.05 10*3/MM3 (ref 0–0.05)
IMM GRANULOCYTES NFR BLD AUTO: 0.7 % (ref 0–0.5)
LYMPHOCYTES # BLD AUTO: 0.63 10*3/MM3 (ref 0.7–3.1)
LYMPHOCYTES NFR BLD AUTO: 8.4 % (ref 19.6–45.3)
MCH RBC QN AUTO: 25.7 PG (ref 26.6–33)
MCHC RBC AUTO-ENTMCNC: 31.7 G/DL (ref 31.5–35.7)
MCV RBC AUTO: 81.2 FL (ref 79–97)
MONOCYTES # BLD AUTO: 1.08 10*3/MM3 (ref 0.1–0.9)
MONOCYTES NFR BLD AUTO: 14.4 % (ref 5–12)
NEUTROPHILS NFR BLD AUTO: 5.67 10*3/MM3 (ref 1.7–7)
NEUTROPHILS NFR BLD AUTO: 75.7 % (ref 42.7–76)
NRBC BLD AUTO-RTO: 0 /100 WBC (ref 0–0.2)
PLATELET # BLD AUTO: 335 10*3/MM3 (ref 140–450)
PMV BLD AUTO: 9.3 FL (ref 6–12)
POTASSIUM SERPL-SCNC: 3.7 MMOL/L (ref 3.5–5.2)
PROT SERPL-MCNC: 7.2 G/DL (ref 6–8.5)
QT INTERVAL: 316 MS
RBC # BLD AUTO: 4.94 10*6/MM3 (ref 3.77–5.28)
SARS-COV-2 RNA RESP QL NAA+PROBE: DETECTED
SODIUM SERPL-SCNC: 138 MMOL/L (ref 136–145)
WBC NRBC COR # BLD: 7.49 10*3/MM3 (ref 3.4–10.8)

## 2023-08-20 PROCEDURE — 85025 COMPLETE CBC W/AUTO DIFF WBC: CPT | Performed by: EMERGENCY MEDICINE

## 2023-08-20 PROCEDURE — 99285 EMERGENCY DEPT VISIT HI MDM: CPT

## 2023-08-20 PROCEDURE — 36415 COLL VENOUS BLD VENIPUNCTURE: CPT | Performed by: EMERGENCY MEDICINE

## 2023-08-20 PROCEDURE — 84703 CHORIONIC GONADOTROPIN ASSAY: CPT | Performed by: EMERGENCY MEDICINE

## 2023-08-20 PROCEDURE — 80053 COMPREHEN METABOLIC PANEL: CPT | Performed by: EMERGENCY MEDICINE

## 2023-08-20 PROCEDURE — 93010 ELECTROCARDIOGRAM REPORT: CPT | Performed by: INTERNAL MEDICINE

## 2023-08-20 PROCEDURE — 87636 SARSCOV2 & INF A&B AMP PRB: CPT | Performed by: EMERGENCY MEDICINE

## 2023-08-20 PROCEDURE — 96374 THER/PROPH/DIAG INJ IV PUSH: CPT

## 2023-08-20 PROCEDURE — 25010000002 ONDANSETRON PER 1 MG: Performed by: EMERGENCY MEDICINE

## 2023-08-20 PROCEDURE — 93005 ELECTROCARDIOGRAM TRACING: CPT

## 2023-08-20 RX ORDER — ACETAMINOPHEN 500 MG
1000 TABLET ORAL ONCE
Status: COMPLETED | OUTPATIENT
Start: 2023-08-20 | End: 2023-08-20

## 2023-08-20 RX ORDER — ONDANSETRON 2 MG/ML
4 INJECTION INTRAMUSCULAR; INTRAVENOUS ONCE
Status: COMPLETED | OUTPATIENT
Start: 2023-08-20 | End: 2023-08-20

## 2023-08-20 RX ORDER — SODIUM CHLORIDE 0.9 % (FLUSH) 0.9 %
10 SYRINGE (ML) INJECTION AS NEEDED
Status: DISCONTINUED | OUTPATIENT
Start: 2023-08-20 | End: 2023-08-21 | Stop reason: HOSPADM

## 2023-08-20 RX ADMIN — ACETAMINOPHEN 1000 MG: 500 TABLET ORAL at 21:13

## 2023-08-20 RX ADMIN — SODIUM CHLORIDE 1000 ML: 9 INJECTION, SOLUTION INTRAVENOUS at 20:10

## 2023-08-20 RX ADMIN — SODIUM CHLORIDE 1000 ML: 9 INJECTION, SOLUTION INTRAVENOUS at 22:40

## 2023-08-20 RX ADMIN — ONDANSETRON 4 MG: 2 INJECTION INTRAMUSCULAR; INTRAVENOUS at 20:11

## 2023-08-20 NOTE — ED PROVIDER NOTES
EMERGENCY DEPARTMENT ENCOUNTER    Room Number:  17/17  PCP: Chris Locke DO  Patient Care Team:  Chris Locke DO as PCP - General (Family Medicine)   Independent Historians: Patient, family    HPI:  Chief Complaint: Near syncope    A complete HPI/ROS/PMH/PSH/SH/FH are unobtainable due to: Nothing    Chronic or social conditions impacting patient care (Social Determinants of Health): None  (Financial Resource Strain / Food Insecurity / Transportation Needs / Physical Activity / Stress / Social Connections / Intimate Partner Violence / Housing Stability)    Context: Jose Alfredo Aaron is a 33 y.o. female who presents to the ED c/o acute near syncopal episode.  The patient reports that since Thursday she has had a cough and sore throat.  She reports that she has not had much of an appetite and has not been eating or drinking very much.  She reports mild headache today.  She reports tonight she took a hot shower.  She reports that she was getting out of the shower and she felt like everything was heavy and the room got dark.  She states that she felt lightheaded.  She reports she vomited.  She states that she has been getting dizzy when she stands.  She denies any abdominal pain.  She denies any bleeding.  She denies fevers.  She denies shortness of breath.  She denies any known exposures.  She denies any prior similar episodes.  She reports she now feels better.  She reports she has felt lightheaded all day.  She reports she was vaccinated against COVID.    Review of prior external notes (non-ED) -and- Review of prior external test results outside of this encounter: Laboratory evaluation dated 3/14/2023 shows normal CMP, normal CBC    Prescription drug monitoring program review:         PAST MEDICAL HISTORY  Active Ambulatory Problems     Diagnosis Date Noted    Mild intermittent asthma with status asthmaticus 03/27/2019    Hypersomnia with sleep apnea 07/10/2022    Class 3 severe obesity due to excess calories  with serious comorbidity and body mass index (BMI) of 50.0 to 59.9 in adult 07/10/2022     Resolved Ambulatory Problems     Diagnosis Date Noted    No Resolved Ambulatory Problems     Past Medical History:   Diagnosis Date    Asthma     Headache     Kidney stone     Low back pain          PAST SURGICAL HISTORY  No past surgical history on file.      FAMILY HISTORY  Family History   Problem Relation Age of Onset    Sleep apnea Mother     Diabetes Mother     Heart disease Mother     Hypertension Mother     Bipolar disorder Mother     Breast cancer Mother     Diabetes Maternal Grandmother          SOCIAL HISTORY  Social History     Socioeconomic History    Marital status:    Tobacco Use    Smoking status: Never    Smokeless tobacco: Never   Vaping Use    Vaping Use: Never used   Substance and Sexual Activity    Alcohol use: No    Drug use: No    Sexual activity: Yes     Partners: Male     Birth control/protection: None         ALLERGIES  Aspirin        REVIEW OF SYSTEMS  Review of Systems  Included in HPI  All systems reviewed and negative except for those discussed in HPI.      PHYSICAL EXAM    I have reviewed the triage vital signs and nursing notes.    ED Triage Vitals   Temp Heart Rate Resp BP SpO2   08/20/23 1938 08/20/23 1938 08/20/23 1938 08/20/23 1948 08/20/23 1938   99.6 øF (37.6 øC) (!) 130 18 125/77 97 %      Temp src Heart Rate Source Patient Position BP Location FiO2 (%)   08/20/23 1938 08/20/23 1938 08/20/23 1948 08/20/23 1948 --   Tympanic Monitor Sitting Right arm        Physical Exam  GENERAL: Awake, alert, no acute distress  SKIN: Warm, dry  HENT: Normocephalic, atraumatic, posterior pharynx without erythema or asymmetry  EYES: no scleral icterus  CV: regular rhythm, tachycardic rate  RESPIRATORY: normal effort, lungs clear  ABDOMEN: soft, nontender, nondistended  MUSCULOSKELETAL: no deformity  NEURO: alert, moves all extremities, follows commands                                                                LAB RESULTS  Recent Results (from the past 24 hour(s))   ECG 12 Lead Syncope    Collection Time: 08/20/23  7:53 PM   Result Value Ref Range    QT Interval 316 ms   hCG, Serum, Qualitative    Collection Time: 08/20/23  8:04 PM    Specimen: Blood   Result Value Ref Range    HCG Qualitative Negative Negative   CBC Auto Differential    Collection Time: 08/20/23  8:04 PM    Specimen: Blood   Result Value Ref Range    WBC 7.49 3.40 - 10.80 10*3/mm3    RBC 4.94 3.77 - 5.28 10*6/mm3    Hemoglobin 12.7 12.0 - 15.9 g/dL    Hematocrit 40.1 34.0 - 46.6 %    MCV 81.2 79.0 - 97.0 fL    MCH 25.7 (L) 26.6 - 33.0 pg    MCHC 31.7 31.5 - 35.7 g/dL    RDW 14.8 12.3 - 15.4 %    RDW-SD 43.2 37.0 - 54.0 fl    MPV 9.3 6.0 - 12.0 fL    Platelets 335 140 - 450 10*3/mm3    Neutrophil % 75.7 42.7 - 76.0 %    Lymphocyte % 8.4 (L) 19.6 - 45.3 %    Monocyte % 14.4 (H) 5.0 - 12.0 %    Eosinophil % 0.4 0.3 - 6.2 %    Basophil % 0.4 0.0 - 1.5 %    Immature Grans % 0.7 (H) 0.0 - 0.5 %    Neutrophils, Absolute 5.67 1.70 - 7.00 10*3/mm3    Lymphocytes, Absolute 0.63 (L) 0.70 - 3.10 10*3/mm3    Monocytes, Absolute 1.08 (H) 0.10 - 0.90 10*3/mm3    Eosinophils, Absolute 0.03 0.00 - 0.40 10*3/mm3    Basophils, Absolute 0.03 0.00 - 0.20 10*3/mm3    Immature Grans, Absolute 0.05 0.00 - 0.05 10*3/mm3    nRBC 0.0 0.0 - 0.2 /100 WBC   COVID-19 and FLU A/B PCR - Swab, Nasopharynx    Collection Time: 08/20/23  8:04 PM    Specimen: Nasopharynx; Swab   Result Value Ref Range    COVID19 Detected (C) Not Detected - Ref. Range    Influenza A PCR Not Detected Not Detected    Influenza B PCR Not Detected Not Detected   Comprehensive Metabolic Panel    Collection Time: 08/20/23  9:50 PM    Specimen: Hand, Right; Blood   Result Value Ref Range    Glucose 92 65 - 99 mg/dL    BUN 8 6 - 20 mg/dL    Creatinine 1.01 (H) 0.57 - 1.00 mg/dL    Sodium 138 136 - 145 mmol/L    Potassium 3.7 3.5 - 5.2 mmol/L    Chloride 105 98 - 107 mmol/L    CO2 21.4 (L) 22.0 - 29.0  mmol/L    Calcium 9.0 8.6 - 10.5 mg/dL    Total Protein 7.2 6.0 - 8.5 g/dL    Albumin 3.7 3.5 - 5.2 g/dL    ALT (SGPT) 23 1 - 33 U/L    AST (SGOT) 18 1 - 32 U/L    Alkaline Phosphatase 79 39 - 117 U/L    Total Bilirubin 0.4 0.0 - 1.2 mg/dL    Globulin 3.5 gm/dL    A/G Ratio 1.1 g/dL    BUN/Creatinine Ratio 7.9 7.0 - 25.0    Anion Gap 11.6 5.0 - 15.0 mmol/L    eGFR 76.0 >60.0 mL/min/1.73       Ordered the above labs and independently reviewed the results.        RADIOLOGY  CT Angiogram Chest    Result Date: 8/21/2023  CT ANGIOGRAM OF THE CHEST  HISTORY: Lightheadedness. Cough and sore throat  COMPARISON: May 31, 2023  TECHNIQUE: Axial CT imaging was obtained through the thorax. IV contrast was administered. Three-D reformatted images were obtained.  FINDINGS: No acute pulmonary thromboembolus is seen. The thoracic aorta is normal in caliber. No dissection is seen. The thyroid gland, trachea, and esophagus appear unremarkable. No coronary artery calcifications are seen. Mediastinal lymph nodes do not appear pathologically enlarged. No acute infiltrates are seen. There is some mild bibasilar atelectasis. Images through the upper abdomen do not demonstrate any acute abnormalities. No acute osseous abnormalities are seen.      Mild bibasilar atelectasis. No other acute findings. Radiation dose reduction techniques were utilized, including automated exposure control and exposure modulation based on body size.   This report was finalized on 8/21/2023 1:52 AM by Dr. Patito Rhodes M.D.       I ordered the above noted radiological studies. Reviewed by me and discussed with radiologist.  See dictation for official radiology interpretation.      PROCEDURES    Procedures      MEDICATIONS GIVEN IN ER    Medications   sodium chloride 0.9 % flush 10 mL (has no administration in time range)   ondansetron (ZOFRAN) injection 4 mg (4 mg Intravenous Given 8/20/23 2011)   sodium chloride 0.9 % bolus 1,000 mL (0 mL Intravenous Stopped  8/20/23 2241)   acetaminophen (TYLENOL) tablet 1,000 mg (1,000 mg Oral Given 8/20/23 2113)   sodium chloride 0.9 % bolus 1,000 mL (0 mL Intravenous Stopped 8/21/23 0055)   iopamidol (ISOVUE-370) 76 % injection 100 mL (100 mL Intravenous Given 8/21/23 0133)         ORDERS PLACED DURING THIS VISIT:  Orders Placed This Encounter   Procedures    COVID-19 and FLU A/B PCR - Swab, Nasopharynx    CT Angiogram Chest    Comprehensive Metabolic Panel    hCG, Serum, Qualitative    CBC Auto Differential    Monitor Blood Pressure    Pulse Oximetry, Continuous    Orthostatic Vitals    ECG 12 Lead Syncope    Insert Peripheral IV    CBC & Differential         PROGRESS, DATA ANALYSIS, CONSULTS, AND MEDICAL DECISION MAKING    All labs have been independently interpreted by me.  All radiology studies have been reviewed by me and discussed with radiologist dictating the report.   EKG's independently viewed and interpreted by me.  Discussion below represents my analysis of pertinent findings related to patient's condition, differential diagnosis, treatment plan and final disposition.    Differential diagnosis includes but is not limited to arrhythmia, dehydration, orthostasis, syncope, PE, intracranial hemorrhage.    ED Course as of 08/21/23 0158   Sun Aug 20, 2023   1955 EKG          EKG time: 1953  Rhythm/Rate: Sinus tachycardia, rate 116  P waves and TN: Normal P, normal TN  QRS, axis: Narrow QRS, normal axis  ST and T waves: No acute    Independently Interpreted by me  Not significantly changed compared to prior 5/31/2021   [TR]   2049 COVID19(!!): Detected [TR]   2153 The patient's chemistries have hemolyzed multiple times.  Lab is being called to draw the chemistries. [TR]   2155 I reviewed the work-up and findings with the patient and family at the bedside.  She reports she is feeling better after IV fluids.  She still reports a mild headache but declines further medication.  She denies any chest pain or shortness of breath.  She  remains somewhat tachycardic.  I have ordered a second liter of IV fluids.  The patient has normal oxygen saturations, no chest pain, no shortness of breath, and my suspicion for PE is low. [TR]   2331 The patient reports she feels significantly better.  She denies any chest pain or shortness of breath.  She has no further dizziness. [TR]   2354 The patient remains tachycardic at rest after IV fluid bolus.  She has no chest pain or shortness of breath but I am going to CTA her chest to rule out PE given that she is COVID-positive and had a near syncopal episode. [TR]   Mon Aug 21, 2023   0147 CT Angiogram Chest  My independent interpretation of the CTA of the chest is no central PE [TR]   0157 I reviewed the work-up and findings with the patient and family at the bedside.  Answered all questions.  Plan discharge home.  They are agreeable.  The patient CTA shows no PE.  Her heart rate is currently 92 at rest. [TR]      ED Course User Index  [TR] Ramón Schaffer MD       I interpreted the cardiac monitor rhythm and my independent interpretation is: Sinus tachycardia, rate of 118. The patient presents with near syncope and the cardiac monitor was ordered to monitor for arrhythmias.    PPE:  The patient wore a mask and I wore a mask and all appropriate PPE throughout the entire patient encounter.       AS OF 01:58 EDT VITALS:    BP - 124/77  HR - 109  TEMP - 99.6 øF (37.6 øC) (Tympanic)  O2 SATS - 97%        DIAGNOSIS  Final diagnoses:   COVID-19   Near syncope   Dehydration         DISPOSITION  ED Disposition       ED Disposition   Discharge    Condition   Stable    Comment   --                  Note Disclaimer: At [facility], we believe that sharing information builds trust and better relationships. You are receiving this note because you recently visited [facility]. It is possible you will see health information before a provider has talked with you about it. This kind of information can be easy to  misunderstand. To help you fully understand what it means for your health, we urge you to discuss this note with your provider.         Ramón Schaffer MD  08/21/23 0158       Ramón Schaffer MD  08/21/23 0159

## 2023-08-20 NOTE — Clinical Note
[facility] EMERGENCY DEPARTMENT  [address] [facility] [address]  Phone: [phone]    Len Aaron accompanied Jose Alfredo Aaron to the emergency department on 8/20/2023. They may return to work on 08/22/2023.        Thank you for choosing [facility].    Ramón Schaffer MD

## 2023-08-20 NOTE — ED TRIAGE NOTES
"Patient presents reporting lightheadedness and head ache throughout the day today and near syncope upon getting out of the shower prior to arrival. Patient states as she got out of the shower she \"blacked out\" - couldn't see or hear but states she did not lose consciousness. Patient states she then vomited a large amount. Patient also reports cough and sore throat since Thursday.  "

## 2023-08-20 NOTE — Clinical Note
[facility] EMERGENCY DEPARTMENT  [address]  [facility] [address]  Phone: [phone]    Jose Alfredo Aaron was seen and treated in our emergency department on 8/20/2023.  She may return to work on 08/23/2023.         Thank you for choosing [facility].    Ramón Schaffer MD

## 2023-08-20 NOTE — Clinical Note
[facility] EMERGENCY DEPARTMENT  [address]  [facility] [address]  Phone: [phone]    Jose Alfredo Aaron was seen and treated in our emergency department on 8/20/2023.  She may return to work on 08/26/2023.         Thank you for choosing [facility].    Ramón Schaffer MD

## 2023-08-20 NOTE — Clinical Note
[facility] EMERGENCY DEPARTMENT  [address]  [facility] [address]  Phone: [phone]    Jose Alfredo Aaron was seen and treated in our emergency department on 8/20/2023.  She may return to work on 08/23/2023.         Thank you for choosing [facility].    Ramón Schaffer MD

## 2023-08-21 ENCOUNTER — APPOINTMENT (OUTPATIENT)
Dept: CT IMAGING | Facility: HOSPITAL | Age: 33
End: 2023-08-21
Payer: COMMERCIAL

## 2023-08-21 VITALS
SYSTOLIC BLOOD PRESSURE: 136 MMHG | OXYGEN SATURATION: 98 % | HEART RATE: 97 BPM | TEMPERATURE: 99.6 F | BODY MASS INDEX: 52.61 KG/M2 | WEIGHT: 268 LBS | HEIGHT: 60 IN | DIASTOLIC BLOOD PRESSURE: 74 MMHG | RESPIRATION RATE: 18 BRPM

## 2023-08-21 PROCEDURE — 71275 CT ANGIOGRAPHY CHEST: CPT

## 2023-08-21 PROCEDURE — 25510000001 IOPAMIDOL PER 1 ML: Performed by: EMERGENCY MEDICINE

## 2023-08-21 RX ORDER — ONDANSETRON 4 MG/1
4 TABLET, ORALLY DISINTEGRATING ORAL 4 TIMES DAILY PRN
Qty: 12 TABLET | Refills: 0 | Status: SHIPPED | OUTPATIENT
Start: 2023-08-21

## 2023-08-21 RX ADMIN — IOPAMIDOL 100 ML: 755 INJECTION, SOLUTION INTRAVENOUS at 01:33

## 2023-08-21 NOTE — DISCHARGE INSTRUCTIONS
Drink plenty fluids to stay hydrated.  Use Tylenol for any fever over 100.4.  Take nausea medicine as prescribed to help with nausea and vomiting.  Return here immediately for any chest pain, shortness of breath, or passing out.

## 2023-08-24 ENCOUNTER — TELEPHONE (OUTPATIENT)
Dept: FAMILY MEDICINE CLINIC | Facility: CLINIC | Age: 33
End: 2023-08-24
Payer: COMMERCIAL

## 2023-08-24 DIAGNOSIS — U07.1 COVID-19 VIRUS DETECTED: Primary | ICD-10-CM

## 2023-08-24 NOTE — TELEPHONE ENCOUNTER
Caller: Roque Aaron    Relationship to patient: Emergency Contact    Best call back number: [phone]    Date of exposure: N/A    Date of positive COVID19 test: 8/22/23    COVID19 symptoms: VOMITING,SORE THROAT,FATIGUE,NO TASTE OR SMELL,CONGESTION,SO TIRED CAN'T GET OUT OF BED    Date of initial quarantine: 8/22/23    Additional information or concerns: PATIENT'S FATHER IN LAW IS REQUESTING IF MEDICATION CA BE CALLED IN FOR PATIENT. HE IS REQUESTING CALLBACK WITH UPDATES.    What is the patients preferred pharmacy: [facility] [id number] - [place], [place] - [address] - [phone] [facility] [phone]  [phone]

## 2023-10-06 DIAGNOSIS — E28.2 PCOS (POLYCYSTIC OVARIAN SYNDROME): ICD-10-CM

## 2023-10-06 DIAGNOSIS — R73.03 PREDIABETES: Primary | ICD-10-CM

## 2023-10-06 DIAGNOSIS — E66.01 MORBID (SEVERE) OBESITY DUE TO EXCESS CALORIES: ICD-10-CM

## 2023-10-06 DIAGNOSIS — E88.819 INSULIN RESISTANCE: ICD-10-CM

## 2023-10-06 RX ORDER — SEMAGLUTIDE 1.34 MG/ML
1 INJECTION, SOLUTION SUBCUTANEOUS WEEKLY
Qty: 3 ML | Refills: 5 | Status: SHIPPED | OUTPATIENT
Start: 2023-10-06

## 2023-10-11 DIAGNOSIS — E11.9 DIABETES MELLITUS TYPE II, NON INSULIN DEPENDENT: Primary | ICD-10-CM

## 2023-10-11 RX ORDER — METFORMIN HYDROCHLORIDE 500 MG/1
500 TABLET, EXTENDED RELEASE ORAL
Qty: 30 TABLET | Refills: 5 | Status: SHIPPED | OUTPATIENT
Start: 2023-10-11

## 2023-10-16 DIAGNOSIS — E11.9 DIABETES MELLITUS TYPE II, NON INSULIN DEPENDENT: ICD-10-CM

## 2023-10-16 DIAGNOSIS — E28.2 PCOS (POLYCYSTIC OVARIAN SYNDROME): ICD-10-CM

## 2023-10-16 DIAGNOSIS — E88.819 INSULIN RESISTANCE: Primary | ICD-10-CM

## 2023-11-28 DIAGNOSIS — E11.9 DIABETES MELLITUS TYPE II, NON INSULIN DEPENDENT: ICD-10-CM

## 2023-11-28 DIAGNOSIS — E88.819 INSULIN RESISTANCE: ICD-10-CM

## 2023-11-28 RX ORDER — SEMAGLUTIDE 0.68 MG/ML
INJECTION, SOLUTION SUBCUTANEOUS
Qty: 3 ML | Refills: 0 | Status: SHIPPED | OUTPATIENT
Start: 2023-11-28

## 2023-11-29 ENCOUNTER — TELEPHONE (OUTPATIENT)
Dept: FAMILY MEDICINE CLINIC | Facility: CLINIC | Age: 33
End: 2023-11-29
Payer: COMMERCIAL

## 2023-11-29 DIAGNOSIS — E11.9 DIABETES MELLITUS TYPE II, NON INSULIN DEPENDENT: Primary | ICD-10-CM

## 2023-11-29 NOTE — TELEPHONE ENCOUNTER
11/29/2023      Jose Alfredo Aaron  [address] [place] [address]          Dear:  Jose Alfredo Aaron        Our records show that it's time for your diabetic nephropathy screen.   Please come to our office between 8:00 AM to 11:45 AM and 1 Pm to 3:30pm to complete.  This is a urine test that looks for protein, an early sign of kidney damage to your kidneys from diabetes.  If you have had this done elsewhere, please call or my chart message our office where and when you have had.   If you have an up coming appointment, we can just do the labs at that time.      If you should have any questions, please feel free to let us know.    Sincerely,    Chris Locke DO, MS

## 2023-11-29 NOTE — TELEPHONE ENCOUNTER
11/29/2023      Jose Alfredo Aaron  [address] [place] [address]          Dear:  Jose Alfredo Aaron        Our records show that it's time for your diabetic labs.   Please come to our office between 8:00 AM to 11:45 AM and 1 Pm to 3:30pm fasting for 8 hours to complete.  If you have had labs done elsewhere, please call or my chart message our office where and when you have had.   If you have an up coming appointment, we can just do the labs at that time.      If you should have any questions, please feel free to let us know.    Sincerely,    Chris Locke DO, MS

## 2023-12-12 DIAGNOSIS — E11.9 DIABETES MELLITUS TYPE II, NON INSULIN DEPENDENT: Primary | ICD-10-CM

## 2023-12-13 LAB
ALBUMIN/CREAT UR: 3 MG/G CREAT (ref 0–29)
CREAT UR-MCNC: 198.7 MG/DL
MICROALBUMIN UR-MCNC: 5.1 UG/ML

## 2023-12-19 ENCOUNTER — OFFICE VISIT (OUTPATIENT)
Dept: FAMILY MEDICINE CLINIC | Facility: CLINIC | Age: 33
End: 2023-12-19
Payer: COMMERCIAL

## 2023-12-19 VITALS
WEIGHT: 257 LBS | HEART RATE: 97 BPM | HEIGHT: 60 IN | BODY MASS INDEX: 50.45 KG/M2 | DIASTOLIC BLOOD PRESSURE: 72 MMHG | OXYGEN SATURATION: 99 % | SYSTOLIC BLOOD PRESSURE: 110 MMHG

## 2023-12-19 DIAGNOSIS — E11.9 TYPE 2 DIABETES MELLITUS WITHOUT COMPLICATION, WITHOUT LONG-TERM CURRENT USE OF INSULIN: Primary | ICD-10-CM

## 2023-12-19 NOTE — PROGRESS NOTES
Tala Aaron is a 33 y.o. female. Presents today for   Chief Complaint   Patient presents with    Diabetes       History of Present Illness  Patien tdoing very well with dm2, lost 40 lbs since march;  feels medication helping and working;   no cp/soa;  no numbness;      Review of Systems   Cardiovascular:  Negative for chest pain and palpitations.   Gastrointestinal:  Negative for abdominal pain.   Neurological:  Negative for numbness.       Patient Active Problem List   Diagnosis    Mild intermittent asthma with status asthmaticus    Hypersomnia with sleep apnea    Class 3 severe obesity due to excess calories with serious comorbidity and body mass index (BMI) of 50.0 to 59.9 in adult       Social History     Socioeconomic History    Marital status:    Tobacco Use    Smoking status: Never    Smokeless tobacco: Never   Vaping Use    Vaping Use: Never used   Substance and Sexual Activity    Alcohol use: No    Drug use: No    Sexual activity: Yes     Partners: Male     Birth control/protection: None       Allergies   Allergen Reactions    Aspirin Hives and Swelling       Current Outpatient Medications on File Prior to Visit   Medication Sig Dispense Refill    Advair -21 MCG/ACT inhaler INHALE TWO PUFFS BY MOUTH TWICE A DAY 12 g 11    albuterol sulfate HFA (ProAir HFA) 108 (90 Base) MCG/ACT inhaler Inhale 2 puffs Every 4 (Four) Hours As Needed for Wheezing. 8 g 11    montelukast (SINGULAIR) 10 MG tablet TAKE ONE TABLET BY MOUTH ONCE NIGHTLY 90 tablet 3    Semaglutide, 2 MG/DOSE, (OZEMPIC) 8 MG/3ML solution pen-injector Inject 2 mg under the skin into the appropriate area as directed 1 (One) Time Per Week. 3 mL 5    Semaglutide, 1 MG/DOSE, (Ozempic, 1 MG/DOSE,) 4 MG/3ML solution pen-injector Inject 1 mg under the skin into the appropriate area as directed 1 (One) Time Per Week. (Patient not taking: Reported on 12/19/2023) 3 mL 5    vitamin D3 125 MCG (5000 UT) capsule capsule Take 1 capsule by  "mouth Daily. (Patient not taking: Reported on 12/19/2023) 90 capsule 1     No current facility-administered medications on file prior to visit.       Objective   Vitals:    12/19/23 1536   BP: 110/72   Pulse: 97   SpO2: 99%   Weight: 117 kg (257 lb)   Height: 152.4 cm (60\")     Body mass index is 50.19 kg/m².    Physical Exam  Vitals and nursing note reviewed.   Constitutional:       Appearance: She is well-developed.   HENT:      Head: Normocephalic and atraumatic.   Neck:      Thyroid: No thyromegaly.      Vascular: No JVD.   Cardiovascular:      Rate and Rhythm: Normal rate and regular rhythm.      Heart sounds: Normal heart sounds. No murmur heard.     No friction rub. No gallop.   Pulmonary:      Effort: Pulmonary effort is normal. No respiratory distress.      Breath sounds: Normal breath sounds. No wheezing or rales.   Abdominal:      General: Bowel sounds are normal. There is no distension.      Palpations: Abdomen is soft.      Tenderness: There is no abdominal tenderness. There is no guarding or rebound.   Musculoskeletal:      Cervical back: Neck supple.   Skin:     General: Skin is warm and dry.   Neurological:      Mental Status: She is alert.   Psychiatric:         Behavior: Behavior normal.       Component      Latest Ref Rng 8/20/2023 12/12/2023   Glucose      65 - 99 mg/dL 92     BUN      6 - 20 mg/dL 8     Creatinine      0.57 - 1.00 mg/dL 1.01 (H)     Sodium      136 - 145 mmol/L 138     Potassium      3.5 - 5.2 mmol/L 3.7     Chloride      98 - 107 mmol/L 105     CO2      22.0 - 29.0 mmol/L 21.4 (L)     Calcium      8.6 - 10.5 mg/dL 9.0     Total Protein      6.0 - 8.5 g/dL 7.2     Albumin      3.5 - 5.2 g/dL 3.7     ALT (SGPT)      1 - 33 U/L 23     AST (SGOT)      1 - 32 U/L 18     Alkaline Phosphatase      39 - 117 U/L 79     Total Bilirubin      0.0 - 1.2 mg/dL 0.4     Globulin      gm/dL 3.5     A/G Ratio      g/dL 1.1     BUN/Creatinine Ratio      7.0 - 25.0  7.9     Anion Gap      5.0 - " 15.0 mmol/L 11.6     eGFR      >60.0 mL/min/1.73 76.0     Creatinine, Urine      Not Estab. mg/dL  198.7    Microalbumin, Urine      Not Estab. ug/mL  5.1    Microalbumin/Creatinine Ratio      0 - 29 mg/g creat  3    Hemoglobin A1C      4.8 - 5.6 %  5.6       Legend:  (H) High  (L) Low  Assessment & Plan   Diagnoses and all orders for this visit:    1. Type 2 diabetes mellitus without complication, without long-term current use of insulin (Primary)    Doing well with diabetes since weight loss;  d/w weigh tloss aides at length;  feels doing well with ozempic and will continue;  has had to go up and down on doses due to shortages, but holding weight down;   will continue ozempic.                    -Follow up: 6 months and prn

## 2024-01-09 DIAGNOSIS — J30.1 SEASONAL ALLERGIC RHINITIS DUE TO POLLEN: ICD-10-CM

## 2024-01-09 RX ORDER — MONTELUKAST SODIUM 10 MG/1
TABLET ORAL
Qty: 90 TABLET | Refills: 1 | Status: SHIPPED | OUTPATIENT
Start: 2024-01-09

## 2024-01-25 RX ORDER — METHYLPREDNISOLONE 4 MG/1
TABLET ORAL
Qty: 21 TABLET | Refills: 0 | Status: SHIPPED | OUTPATIENT
Start: 2024-01-25

## 2024-02-04 ENCOUNTER — TELEPHONE (OUTPATIENT)
Dept: FAMILY MEDICINE CLINIC | Facility: CLINIC | Age: 34
End: 2024-02-04
Payer: COMMERCIAL

## 2024-02-05 NOTE — TELEPHONE ENCOUNTER
----- Message from Kandace Rodriguez MA sent at 2/1/2024 11:45 AM EST -----  Regarding: FW: Hands hurting   Contact: [phone]    ----- Message -----  From: Jose Alfredo Aaron  Sent: 2/1/2024  11:43 AM EST  To: Ekta Peraza [facility]  Subject: Hands hurting                                    I'm not sure if it helped. Last night my left hand started burning again but I felt it up to my elbow. It hurts the most when touched. Is there something I can do until I can get in?

## 2024-02-05 NOTE — TELEPHONE ENCOUNTER
Spoke with patient she will be giving us a call back wether or not she can come in. She is going to call insurance first.

## 2024-02-23 DIAGNOSIS — R73.03 PREDIABETES: ICD-10-CM

## 2024-02-23 DIAGNOSIS — E28.2 PCOS (POLYCYSTIC OVARIAN SYNDROME): ICD-10-CM

## 2024-02-23 DIAGNOSIS — E66.01 MORBID (SEVERE) OBESITY DUE TO EXCESS CALORIES: ICD-10-CM

## 2024-02-23 RX ORDER — SEMAGLUTIDE 2.68 MG/ML
INJECTION, SOLUTION SUBCUTANEOUS
Qty: 3 ML | Refills: 5 | Status: SHIPPED | OUTPATIENT
Start: 2024-02-23

## 2024-03-05 DIAGNOSIS — J45.40 MODERATE PERSISTENT ASTHMA WITHOUT COMPLICATION: ICD-10-CM

## 2024-03-05 RX ORDER — ALBUTEROL SULFATE 90 UG/1
2 AEROSOL, METERED RESPIRATORY (INHALATION) EVERY 4 HOURS PRN
Qty: 18 G | Refills: 1 | Status: SHIPPED | OUTPATIENT
Start: 2024-03-05

## 2024-03-29 ENCOUNTER — TELEPHONE (OUTPATIENT)
Dept: FAMILY MEDICINE CLINIC | Facility: CLINIC | Age: 34
End: 2024-03-29
Payer: COMMERCIAL

## 2024-03-29 NOTE — TELEPHONE ENCOUNTER
Caller: Jose Alfredo Aaron    Relationship to patient: Self    Best call back number: [phone]    Chief complaint:      Type of visit:      Requested date:       If rescheduling, when is the original appointment:       Additional notes: PATIENT CALLED STATED SHE HAS PAIN IN BOTH HANDS BUT LEFT THAT WORSE, BURNING SENSATION ON PINKY TO WRIST ON BACK OF HAND MOVED UP ARM LITTLE BUT NOT PAST THE ELBOW.  SHE ONLY WANTS TO SEE DR GAFFNEY AND THE EARLIEST APPT HUB HAS 6/13/24 F BUT SHE NEEDS TO BE SEEN SOONER.  PLEASE CALL HER BACK FOR EARLIER APPT.  PLEASE LEAVE HER A VM DUE TO SHE IS AT WORK AND NOT ABLE TO ANSWER PHONE.  SECURE VM.

## 2024-04-10 ENCOUNTER — OFFICE VISIT (OUTPATIENT)
Dept: FAMILY MEDICINE CLINIC | Facility: CLINIC | Age: 34
End: 2024-04-10
Payer: COMMERCIAL

## 2024-04-10 VITALS
HEIGHT: 60 IN | HEART RATE: 96 BPM | SYSTOLIC BLOOD PRESSURE: 100 MMHG | BODY MASS INDEX: 48.29 KG/M2 | OXYGEN SATURATION: 99 % | DIASTOLIC BLOOD PRESSURE: 66 MMHG | WEIGHT: 246 LBS

## 2024-04-10 DIAGNOSIS — J45.40 MODERATE PERSISTENT ASTHMA WITHOUT COMPLICATION: ICD-10-CM

## 2024-04-10 DIAGNOSIS — E11.9 TYPE 2 DIABETES MELLITUS WITHOUT COMPLICATION, WITHOUT LONG-TERM CURRENT USE OF INSULIN: Primary | ICD-10-CM

## 2024-04-10 DIAGNOSIS — E55.9 VITAMIN D DEFICIENCY: ICD-10-CM

## 2024-04-10 DIAGNOSIS — G56.23 ENTRAPMENT OF BOTH ULNAR NERVES: ICD-10-CM

## 2024-04-10 RX ORDER — FLUTICASONE PROPIONATE AND SALMETEROL 250; 50 UG/1; UG/1
1 POWDER RESPIRATORY (INHALATION)
Qty: 3 EACH | Refills: 3 | Status: SHIPPED | OUTPATIENT
Start: 2024-04-10

## 2024-04-10 RX ORDER — METHYLPREDNISOLONE 4 MG/1
TABLET ORAL
Qty: 21 TABLET | Refills: 0 | Status: SHIPPED | OUTPATIENT
Start: 2024-04-10

## 2024-04-10 NOTE — PROGRESS NOTES
Tala Aaron is a 34 y.o. female. Presents today for   Chief Complaint   Patient presents with    Tingling     Burning & numbness in arms     Diabetes       History of Present Illness  Patient 35 yo with dm2;   having burning/tingling left > right;   no neck pain; no trauma;   Is on ozempic and has gotten blood sugars well controlled, losing progressive weight.   Review of Systems   Respiratory:  Negative for shortness of breath.    Cardiovascular:  Negative for chest pain and palpitations.   Neurological:  Positive for numbness.       Patient Active Problem List   Diagnosis    Mild intermittent asthma with status asthmaticus    Hypersomnia with sleep apnea    Class 3 severe obesity due to excess calories with serious comorbidity and body mass index (BMI) of 50.0 to 59.9 in adult       Social History     Socioeconomic History    Marital status:    Tobacco Use    Smoking status: Never    Smokeless tobacco: Never   Vaping Use    Vaping status: Never Used   Substance and Sexual Activity    Alcohol use: No    Drug use: No    Sexual activity: Yes     Partners: Male     Birth control/protection: None       Allergies   Allergen Reactions    Aspirin Hives and Swelling       Current Outpatient Medications on File Prior to Visit   Medication Sig Dispense Refill    albuterol sulfate  (90 Base) MCG/ACT inhaler INHALE TWO PUFFS BY MOUTH EVERY 4 HOURS AS NEEDED FOR WHEEZING 18 g 1    montelukast (SINGULAIR) 10 MG tablet TAKE ONE TABLET BY MOUTH ONCE NIGHTLY 90 tablet 1    Ozempic, 2 MG/DOSE, 8 MG/3ML solution pen-injector DIAL AND INJECT UNDER THE SKIN 2 MG WEEKLY 3 mL 5    Advair -21 MCG/ACT inhaler INHALE TWO PUFFS BY MOUTH TWICE A DAY (Patient not taking: Reported on 4/10/2024) 12 g 11    vitamin D3 125 MCG (5000 UT) capsule capsule Take 1 capsule by mouth Daily. (Patient not taking: Reported on 4/10/2024) 90 capsule 1    [DISCONTINUED] methylPREDNISolone (MEDROL) 4 MG dose pack Take as  "directed on package instructions. (Patient not taking: Reported on 4/10/2024) 21 tablet 0     No current facility-administered medications on file prior to visit.       Objective   Vitals:    04/10/24 1258   BP: 100/66   Pulse: 96   SpO2: 99%   Weight: 112 kg (246 lb)   Height: 152.4 cm (60\")     Body mass index is 48.04 kg/m².    Physical Exam  Vitals and nursing note reviewed.   Constitutional:       Appearance: She is well-developed.   HENT:      Head: Normocephalic and atraumatic.   Neck:      Thyroid: No thyromegaly.      Vascular: No JVD.   Cardiovascular:      Rate and Rhythm: Normal rate and regular rhythm.      Heart sounds: Normal heart sounds. No murmur heard.     No friction rub. No gallop.   Pulmonary:      Effort: Pulmonary effort is normal. No respiratory distress.      Breath sounds: Normal breath sounds. No wheezing or rales.   Abdominal:      General: Bowel sounds are normal. There is no distension.      Palpations: Abdomen is soft.      Tenderness: There is no abdominal tenderness. There is no guarding or rebound.   Musculoskeletal:      Cervical back: Neck supple.      Comments: +tinels b/l cubital tunnels  Negative for carpal   Skin:     General: Skin is warm and dry.   Neurological:      Mental Status: She is alert.   Psychiatric:         Behavior: Behavior normal.         Assessment & Plan   Diagnoses and all orders for this visit:    1. Type 2 diabetes mellitus without complication, without long-term current use of insulin (Primary)  -     Microalbumin / Creatinine Urine Ratio - Urine, Clean Catch  -     Lipid Panel  -     Comprehensive Metabolic Panel  -     Hemoglobin A1c    2. Entrapment of both ulnar nerves  -     EMG & Nerve Conduction Test; Future  -     methylPREDNISolone (MEDROL) 4 MG dose pack; Take as directed on package instructions.  Dispense: 21 tablet; Refill: 0    3. Moderate persistent asthma without complication  -     Fluticasone-Salmeterol (ADVAIR/WIXELA) 250-50 MCG/ACT " DISKUS; Inhale 1 puff 2 (Two) Times a Day. Disp 3 inhalers with 3 ref  Dispense: 3 each; Refill: 3    4. Vitamin D deficiency  -     vitamin D3 125 MCG (5000 UT) capsule capsule; Take 1 capsule by mouth Daily.  Dispense: 90 capsule; Refill: 1    -dm2 - continue medications, recheck labs  -asthma, doing well advair not covered, try wixela  -due check vitamin D  -check EMG and trial steroid pack;  avoid leaning on elbows.                   -Follow up: 6 months and prn

## 2024-04-11 LAB
ALBUMIN SERPL-MCNC: 4.3 G/DL (ref 3.9–4.9)
ALBUMIN/CREAT UR: 3 MG/G CREAT (ref 0–29)
ALBUMIN/GLOB SERPL: 1.5 {RATIO} (ref 1.2–2.2)
ALP SERPL-CCNC: 86 IU/L (ref 44–121)
ALT SERPL-CCNC: 14 IU/L (ref 0–32)
AST SERPL-CCNC: 12 IU/L (ref 0–40)
BILIRUB SERPL-MCNC: 0.3 MG/DL (ref 0–1.2)
BUN SERPL-MCNC: 10 MG/DL (ref 6–20)
BUN/CREAT SERPL: 12 (ref 9–23)
CALCIUM SERPL-MCNC: 9.5 MG/DL (ref 8.7–10.2)
CHLORIDE SERPL-SCNC: 103 MMOL/L (ref 96–106)
CHOLEST SERPL-MCNC: 162 MG/DL (ref 100–199)
CO2 SERPL-SCNC: 23 MMOL/L (ref 20–29)
CREAT SERPL-MCNC: 0.86 MG/DL (ref 0.57–1)
CREAT UR-MCNC: 226.7 MG/DL
EGFRCR SERPLBLD CKD-EPI 2021: 91 ML/MIN/1.73
GLOBULIN SER CALC-MCNC: 2.8 G/DL (ref 1.5–4.5)
GLUCOSE SERPL-MCNC: 69 MG/DL (ref 70–99)
HBA1C MFR BLD: 5.6 % (ref 4.8–5.6)
HDLC SERPL-MCNC: 37 MG/DL
LDLC SERPL CALC-MCNC: 112 MG/DL (ref 0–99)
MICROALBUMIN UR-MCNC: 7.4 UG/ML
POTASSIUM SERPL-SCNC: 4.3 MMOL/L (ref 3.5–5.2)
PROT SERPL-MCNC: 7.1 G/DL (ref 6–8.5)
SODIUM SERPL-SCNC: 139 MMOL/L (ref 134–144)
TRIGL SERPL-MCNC: 67 MG/DL (ref 0–149)
VLDLC SERPL CALC-MCNC: 13 MG/DL (ref 5–40)

## 2024-04-14 NOTE — PROGRESS NOTES
Callresults to patient.  Cholesterol improved, but not quite goal.  I would send and start rosuvastatin 5mg 1 po daily #90 3 ref  Diabetes is very well controlled  Kidney and liver function normal (kidney function did improve).

## 2024-04-15 RX ORDER — ROSUVASTATIN CALCIUM 5 MG/1
5 TABLET, COATED ORAL DAILY
Qty: 90 TABLET | Refills: 3 | Status: SHIPPED | OUTPATIENT
Start: 2024-04-15

## 2024-05-23 ENCOUNTER — OFFICE VISIT (OUTPATIENT)
Dept: OBSTETRICS AND GYNECOLOGY | Facility: CLINIC | Age: 34
End: 2024-05-23
Payer: COMMERCIAL

## 2024-05-23 VITALS
HEIGHT: 60 IN | BODY MASS INDEX: 47.71 KG/M2 | DIASTOLIC BLOOD PRESSURE: 67 MMHG | SYSTOLIC BLOOD PRESSURE: 110 MMHG | WEIGHT: 243 LBS

## 2024-05-23 DIAGNOSIS — Z01.419 WOMEN'S ANNUAL ROUTINE GYNECOLOGICAL EXAMINATION: Primary | ICD-10-CM

## 2024-05-23 NOTE — PROGRESS NOTES
GYN Annual Exam     Chief Complaint   Patient presents with    Gynecologic Exam     Pt here today for AE, last pap  NIL     HPI    Jose Alfredo Aaron is a 34 y.o. female who presents for annual well woman exam.  She is sexually active. Periods are regular every 28-30 days, lasting 7 days. Dysmenorrhea:none. Cyclic symptoms include none. No intermenstrual bleeding, spotting, or discharge. Performing SBE:completes. Prior care with Dr. Ni. Hx labial cyst, no recent issues since last I&D in 2024. Family hx breast cancer, pt mother. She is not sure how old her mom was when she was diagnosed. Reports her partner has hx frequent yeast infections, she does have diabetes and is not circumcised. He suggested she be evaluated for this. She denies any current vaginal discharge, itching, or odor.     OB History          0    Para   0    Term   0       0    AB   0    Living   0         SAB   0    IAB   0    Ectopic   0    Molar   0    Multiple   0    Live Births   0          Obstetric Comments   Would like to conceive             LMP- 24  Current contraception: none  Last Pap-  NIL  History of abnormal Pap smear: no  History of STD-denies  Family history of uterine, colon or ovarian cancer: no  Family history of breast cancer: yes - mother    Past Medical History:   Diagnosis Date    Asthma     Headache     Kidney stone     Low back pain        History reviewed. No pertinent surgical history.      Current Outpatient Medications:     albuterol sulfate  (90 Base) MCG/ACT inhaler, INHALE TWO PUFFS BY MOUTH EVERY 4 HOURS AS NEEDED FOR WHEEZING, Disp: 18 g, Rfl: 1    Fluticasone-Salmeterol (ADVAIR/WIXELA) 250-50 MCG/ACT DISKUS, Inhale 1 puff 2 (Two) Times a Day. Disp 3 inhalers with 3 ref, Disp: 3 each, Rfl: 3    montelukast (SINGULAIR) 10 MG tablet, TAKE ONE TABLET BY MOUTH ONCE NIGHTLY, Disp: 90 tablet, Rfl: 1    Ozempic, 2 MG/DOSE, 8 MG/3ML solution pen-injector, DIAL AND INJECT UNDER THE SKIN  Script printed in office for the following medication:   Amlodipine 5 mg tab   Take one tab by mouth 2 times daily   Writer verbally called script into pharmacy and script in office that printed destroyed in office.    "2 MG WEEKLY, Disp: 3 mL, Rfl: 5    rosuvastatin (Crestor) 5 MG tablet, Take 1 tablet by mouth Daily., Disp: 90 tablet, Rfl: 3    vitamin D3 125 MCG (5000 UT) capsule capsule, Take 1 capsule by mouth Daily., Disp: 90 capsule, Rfl: 1    Allergies   Allergen Reactions    Aspirin Hives and Swelling       Social History     Tobacco Use    Smoking status: Never    Smokeless tobacco: Never   Vaping Use    Vaping status: Never Used   Substance Use Topics    Alcohol use: No    Drug use: No       Family History   Problem Relation Age of Onset    Sleep apnea Mother     Diabetes Mother     Heart disease Mother     Hypertension Mother     Bipolar disorder Mother     Breast cancer Mother     Diabetes Maternal Grandmother        Review of Systems   Constitutional:  Negative for chills, fatigue and fever.   Gastrointestinal:  Negative for abdominal distention, abdominal pain, nausea and vomiting.   Genitourinary:  Negative for breast discharge, breast lump, breast pain, dysuria, frequency, menstrual problem, pelvic pain, pelvic pressure, urgency, vaginal bleeding, vaginal discharge and vaginal pain.   Musculoskeletal:  Negative for gait problem.   Skin:  Negative for rash.   Neurological:  Negative for dizziness and headache.   Psychiatric/Behavioral:  Negative for behavioral problems.        /67   Ht 152.4 cm (60\")   Wt 110 kg (243 lb)   LMP 04/23/2024   BMI 47.46 kg/m²     Physical Exam  Constitutional:       General: She is not in acute distress.     Appearance: Normal appearance. She is obese. She is not ill-appearing, toxic-appearing or diaphoretic.   Genitourinary:      Vulva, bladder and urethral meatus normal.      No lesions in the vagina.      Right Labia: No rash, tenderness, lesions, skin changes or Bartholin's cyst.     Left Labia: No tenderness, lesions, skin changes, Bartholin's cyst or rash.     No labial fusion noted.      No inguinal adenopathy present in the right or left side.     No vaginal discharge, " erythema, tenderness, bleeding or ulceration.      No vaginal prolapse present.     No vaginal atrophy present.       Right Adnexa: not tender, not full, not palpable, no mass present and not absent.     Left Adnexa: not tender, not full, not palpable, no mass present and not absent.     No cervical motion tenderness, discharge, friability, lesion, polyp, nabothian cyst or eversion.      Uterus is not enlarged, fixed, tender, irregular or prolapsed.      No uterine mass detected.     No urethral tenderness or mass present.      Pelvic exam was performed with patient in the lithotomy position.   Breasts:     Breasts are symmetrical.      Right: Present. No swelling, bleeding, inverted nipple, mass, nipple discharge, skin change, tenderness or breast implant.      Left: Present. No swelling, bleeding, inverted nipple, mass, nipple discharge, skin change, tenderness or breast implant.   HENT:      Head: Normocephalic and atraumatic.   Eyes:      Pupils: Pupils are equal, round, and reactive to light.   Cardiovascular:      Rate and Rhythm: Normal rate.   Pulmonary:      Effort: Pulmonary effort is normal.   Abdominal:      General: There is no distension.      Palpations: Abdomen is soft. There is no mass.      Tenderness: There is no abdominal tenderness. There is no guarding.      Hernia: No hernia is present. There is no hernia in the left inguinal area or right inguinal area.   Musculoskeletal:         General: Normal range of motion.      Cervical back: Normal range of motion and neck supple. No tenderness.   Lymphadenopathy:      Cervical: No cervical adenopathy.      Upper Body:      Right upper body: No supraclavicular, axillary or pectoral adenopathy.      Left upper body: No supraclavicular, axillary or pectoral adenopathy.      Lower Body: No right inguinal adenopathy. No left inguinal adenopathy.   Neurological:      General: No focal deficit present.      Mental Status: She is alert and oriented to person,  place, and time.      Cranial Nerves: No cranial nerve deficit.   Skin:     General: Skin is warm and dry.   Psychiatric:         Mood and Affect: Mood normal.         Behavior: Behavior normal.         Thought Content: Thought content normal.         Judgment: Judgment normal.   Vitals and nursing note reviewed.       Assessment   Diagnoses and all orders for this visit:    1. Women's annual routine gynecological examination (Primary)  -     IGP, Apt HPV,rfx 16 / 18,45       Plan   Well woman exam: Pap smear collected. Recommend MVI daily.    Contraception: declines  STD: Enc condoms. Desires STD screen today- No.   Smoking status: nonsmoker   Encouraged annual mammogram screening starting at age 40. Instructed on how to perform SBE. Encouraged breast health self awareness.  6.    Encouraged 150 minutes of exercise per week if not medially contraindicated.   7.    Morbidly obese by BMI 47.46  8.    Family hx breast cancer: encouraged her to discuss with her mother when she was diagnosed to help guide when Jose Alfredo should begin screening. Discussed early baseline mammogram around 36  9.    She has no current S&S of yeast. Discussed increased glucose and uncircumcised penis are contributing factors for her partners frequent yeast infections. Encouraged cotton only underwear, mild or no soaps,and discussed vaginal health probiotics. F/u with any S&S    Return in about 1 year (around 5/23/2025) for Annual physical.    Charlene Ford, APRN  5/23/2024  17:57 EDT

## 2024-05-29 LAB
CYTOLOGIST CVX/VAG CYTO: NORMAL
CYTOLOGY CVX/VAG DOC CYTO: NORMAL
CYTOLOGY CVX/VAG DOC THIN PREP: NORMAL
DX ICD CODE: NORMAL
HPV I/H RISK 4 DNA CVX QL PROBE+SIG AMP: NEGATIVE
Lab: NORMAL
OTHER STN SPEC: NORMAL
STAT OF ADQ CVX/VAG CYTO-IMP: NORMAL

## 2024-06-19 ENCOUNTER — TELEPHONE (OUTPATIENT)
Dept: NEUROLOGY | Facility: CLINIC | Age: 34
End: 2024-06-19
Payer: COMMERCIAL

## 2024-06-19 NOTE — TELEPHONE ENCOUNTER
Pt scheduled for EMG on day that provider will not be in office. Called pt to reschedule. Pt stated she would have to call back.

## 2024-06-26 ENCOUNTER — PROCEDURE VISIT (OUTPATIENT)
Dept: NEUROLOGY | Facility: CLINIC | Age: 34
End: 2024-06-26
Payer: COMMERCIAL

## 2024-06-26 DIAGNOSIS — G56.23 ULNAR NEUROPATHY OF BOTH UPPER EXTREMITIES: Primary | ICD-10-CM

## 2024-06-26 NOTE — PROGRESS NOTES
EMG and Nerve Conduction Studies    I.      Instrument used: Neuromax 1002  II.     Please see data sheets for tabular summary of NCS and details on methods, temperatures and lab standards.   III.    EMG muscles tested for upper extremity studies include the deltoid, biceps, triceps, pronator teres, extensor digitorum communis, first dorsal interosseous and abductor pollicis brevis.    IV.   EMG muscles tested for lower extremity studies include the vastus lateralis, tibialis anterior, peroneus longus, medial gastrocnemius and extensor digitorum brevis.    V.    Additional muscles tested as needed.  Paraspinal muscles tested as needed.   VI.   Please see data sheets for tabular summary of EMG findings.   VII. The complete report includes the data sheets.      Indication: Numbness and tingling ulnar aspects of both forearms and hands worse on the left and starting earlier on the left.  History: 34-year-old woman with approximate 15-year history of diabetes who has baseline numbness tingling left lateral thigh who has developed some numbness and tingling in the ulnar aspects of both forearms and hands beginning on the left and being more severe on the left than on the right.  She denies thyroid disease    Ht: 152.4 cm  Wt: 110 kg  HbA1C:   Lab Results   Component Value Date    HGBA1C 5.6 04/10/2024     TSH:   Lab Results   Component Value Date    TSH 3.950 03/14/2023       Technical summary:  Nerve conduction studies were obtained in both arms.  Skin temperatures were a bit cool and so hands were warmed prior to study.  Temperatures were then 32 °C or more measured on the palms.  Needle examination was obtained on selected muscles in both arms.    Results:  1.  Normal median antidromic sensory distal latencies and amplitudes bilaterally.  2.  Normal ulnar antidromic sensory distal latencies and amplitudes bilaterally.  3.  Normal median motor conduction velocities, distal latencies and amplitudes bilaterally.  4.   Slow ulnar motor conduction velocities in the short segment across the elbows at 40 m/s on the left and 46.2 m/s on the right with normal velocities below the elbows.  Normal distal latencies and amplitudes bilaterally.  5.  Needle examination of selected muscles in both arms showed normal insertional activities throughout with normal motor units and recruitment patterns throughout.    Impression:  Abnormal study showing moderate bilateral ulnar neuropathies at the elbows.  The left is a little worse than the right but still in the moderate range.  There is no evidence of a median neuropathy  or  cervical radiculopathy on either side by this study.  Study results were discussed with the patient.    William Chow M.D.        Addendum:  Brief power testing reveals full power in both abductor pollicis brevis muscles but mild weakness in the first dorsal interosseous and abductor digiti quinti muscles bilaterally.  GNS      Dictated utilizing Dragon dictation.

## 2024-07-01 DIAGNOSIS — G56.23 ENTRAPMENT OF BOTH ULNAR NERVES: ICD-10-CM

## 2024-07-05 DIAGNOSIS — J30.1 SEASONAL ALLERGIC RHINITIS DUE TO POLLEN: ICD-10-CM

## 2024-07-05 RX ORDER — MONTELUKAST SODIUM 10 MG/1
TABLET ORAL
Qty: 90 TABLET | Refills: 1 | Status: SHIPPED | OUTPATIENT
Start: 2024-07-05

## 2024-07-09 ENCOUNTER — OFFICE VISIT (OUTPATIENT)
Dept: FAMILY MEDICINE CLINIC | Facility: CLINIC | Age: 34
End: 2024-07-09
Payer: COMMERCIAL

## 2024-07-09 VITALS
BODY MASS INDEX: 47.91 KG/M2 | SYSTOLIC BLOOD PRESSURE: 110 MMHG | HEART RATE: 112 BPM | WEIGHT: 244 LBS | HEIGHT: 60 IN | DIASTOLIC BLOOD PRESSURE: 64 MMHG | OXYGEN SATURATION: 98 %

## 2024-07-09 DIAGNOSIS — G56.23 ULNAR NEUROPATHY OF BOTH UPPER EXTREMITIES: Primary | ICD-10-CM

## 2024-07-09 PROCEDURE — 99213 OFFICE O/P EST LOW 20 MIN: CPT | Performed by: FAMILY MEDICINE

## 2024-07-09 RX ORDER — NAPROXEN 500 MG/1
500 TABLET ORAL 2 TIMES DAILY WITH MEALS
Qty: 30 TABLET | Refills: 0 | Status: SHIPPED | OUTPATIENT
Start: 2024-07-09

## 2024-07-09 NOTE — PROGRESS NOTES
Tala Aaron is a 34 y.o. female. Presents today for   Chief Complaint   Patient presents with    Diabetes    Hyperlipidemia       History of Present Illness  Patient 35 y/o female with numbness b/l 4/5 digits and drops objections;  She does have dm2, though normalized A1c after massive weight loss.    Review of Systems   Musculoskeletal:  Positive for arthralgias.   Neurological:  Positive for numbness.       Patient Active Problem List   Diagnosis    Mild intermittent asthma with status asthmaticus    Hypersomnia with sleep apnea    Class 3 severe obesity due to excess calories with serious comorbidity and body mass index (BMI) of 50.0 to 59.9 in adult       Social History     Socioeconomic History    Marital status:    Tobacco Use    Smoking status: Never    Smokeless tobacco: Never   Vaping Use    Vaping status: Never Used   Substance and Sexual Activity    Alcohol use: No    Drug use: No    Sexual activity: Yes     Partners: Male     Birth control/protection: None       Allergies   Allergen Reactions    Aspirin Hives and Swelling       Current Outpatient Medications on File Prior to Visit   Medication Sig Dispense Refill    albuterol sulfate  (90 Base) MCG/ACT inhaler INHALE TWO PUFFS BY MOUTH EVERY 4 HOURS AS NEEDED FOR WHEEZING 18 g 1    Fluticasone-Salmeterol (ADVAIR/WIXELA) 250-50 MCG/ACT DISKUS Inhale 1 puff 2 (Two) Times a Day. Disp 3 inhalers with 3 ref 3 each 3    montelukast (SINGULAIR) 10 MG tablet TAKE ONE TABLET BY MOUTH ONCE NIGHTLY 90 tablet 1    Ozempic, 2 MG/DOSE, 8 MG/3ML solution pen-injector DIAL AND INJECT UNDER THE SKIN 2 MG WEEKLY 3 mL 5    rosuvastatin (Crestor) 5 MG tablet Take 1 tablet by mouth Daily. 90 tablet 3    vitamin D3 125 MCG (5000 UT) capsule capsule Take 1 capsule by mouth Daily. 90 capsule 1     No current facility-administered medications on file prior to visit.       Objective   Vitals:    07/09/24 1441   BP: 110/64   Pulse: 112   SpO2: 98%  "  Weight: 111 kg (244 lb)   Height: 152.4 cm (60\")     Body mass index is 47.65 kg/m².  Weight Weight (kg) Weight (lbs) Weight Method Visit Report                 7/9/2024 110.678 kg 244 lb - Report   5/23/2024 110.224 kg 243 lb - Report   4/10/2024 111.585 kg 246 lb - Report   12/19/2023 116.574 kg 257 lb - Report   8/20/2023 121.564 kg 268 lb Stated;Estimated -   6/19/2023 126.1 kg 278 lb - Report   3/13/2023 134.718 kg 297 lb - Report   2/6/2023 134.809 kg 297 lb 3.2 oz - Report   1/26/2023 134.083 kg 295 lb 9.6 oz - Report   1/9/2023 145.151 kg Abnormally high  320 lb Abnormally high  Stated -     Physical Exam  Vitals and nursing note reviewed.   Constitutional:       Appearance: Normal appearance. She is not toxic-appearing or diaphoretic.   HENT:      Head: Normocephalic and atraumatic.   Musculoskeletal:      Right hand: Normal strength. Normal strength of finger abduction, thumb/finger opposition and wrist extension.      Left hand: Normal strength. Normal strength of finger abduction, thumb/finger opposition and wrist extension.      Cervical back: Neck supple.      Comments: +tinels   Skin:     General: Skin is warm and dry.      Capillary Refill: Capillary refill takes less than 2 seconds.   Neurological:      Mental Status: She is alert.   Psychiatric:         Mood and Affect: Mood normal.         Behavior: Behavior normal.         Assessment & Plan   Diagnoses and all orders for this visit:    1. Ulnar neuropathy of both upper extremities (Primary)  -     naproxen (Naprosyn) 500 MG tablet; Take 1 tablet by mouth 2 (Two) Times a Day With Meals.  Dispense: 30 tablet; Refill: 0      Try elbow sleeves with padding and radha give short course of nsaids.                  -Follow up: 3 months and prn   Answers submitted by the patient for this visit:  Other (Submitted on 7/9/2024)  Please describe your symptoms.: Follow up appointment for neuropathy in arms  Have you had these symptoms before?: Yes  How long " have you been having these symptoms?: Greater than 2 weeks  Primary Reason for Visit (Submitted on 7/9/2024)  What is the primary reason for your visit?: Other

## 2024-08-14 DIAGNOSIS — R73.03 PREDIABETES: ICD-10-CM

## 2024-08-14 DIAGNOSIS — E66.01 MORBID (SEVERE) OBESITY DUE TO EXCESS CALORIES: ICD-10-CM

## 2024-08-14 DIAGNOSIS — E28.2 PCOS (POLYCYSTIC OVARIAN SYNDROME): ICD-10-CM

## 2024-08-14 RX ORDER — SEMAGLUTIDE 2.68 MG/ML
INJECTION, SOLUTION SUBCUTANEOUS
Qty: 3 ML | Refills: 5 | Status: SHIPPED | OUTPATIENT
Start: 2024-08-14

## 2024-09-29 DIAGNOSIS — E55.9 VITAMIN D DEFICIENCY: ICD-10-CM

## 2024-10-28 ENCOUNTER — OFFICE VISIT (OUTPATIENT)
Dept: FAMILY MEDICINE CLINIC | Facility: CLINIC | Age: 34
End: 2024-10-28
Payer: COMMERCIAL

## 2024-10-28 VITALS
OXYGEN SATURATION: 99 % | HEART RATE: 94 BPM | DIASTOLIC BLOOD PRESSURE: 72 MMHG | BODY MASS INDEX: 46.92 KG/M2 | SYSTOLIC BLOOD PRESSURE: 122 MMHG | WEIGHT: 239 LBS | HEIGHT: 60 IN

## 2024-10-28 DIAGNOSIS — E11.9 TYPE 2 DIABETES MELLITUS WITHOUT COMPLICATION, WITHOUT LONG-TERM CURRENT USE OF INSULIN: Primary | ICD-10-CM

## 2024-10-28 DIAGNOSIS — E78.5 DYSLIPIDEMIA: ICD-10-CM

## 2024-10-28 DIAGNOSIS — G56.23 ENTRAPMENT OF BOTH ULNAR NERVES: ICD-10-CM

## 2024-10-28 DIAGNOSIS — E66.01 MORBID OBESITY: ICD-10-CM

## 2024-10-28 DIAGNOSIS — G43.109 MIGRAINE WITH AURA AND WITHOUT STATUS MIGRAINOSUS, NOT INTRACTABLE: ICD-10-CM

## 2024-10-28 RX ORDER — SUMATRIPTAN 100 MG/1
TABLET, FILM COATED ORAL
Qty: 10 TABLET | Refills: 5 | Status: SHIPPED | OUTPATIENT
Start: 2024-10-28

## 2024-10-28 NOTE — PROGRESS NOTES
Tala Aaron is a 34 y.o. female. Presents today for   Chief Complaint   Patient presents with    Hyperlipidemia    Diabetes    Headache     Migraine??       History of Present Illness  Patient 35 y/o with dm2, doing well and losing weight intentionally;   due labs, including lipid check.   She has pounding headaches, hx of migraines;   Also has ulnar nerve entrapment, using elbow sleeve and has helped.      Review of Systems   Respiratory:  Negative for shortness of breath.    Cardiovascular:  Negative for chest pain and palpitations.   Neurological:  Positive for numbness.       Patient Active Problem List   Diagnosis    Mild intermittent asthma with status asthmaticus    Hypersomnia with sleep apnea    Class 3 severe obesity due to excess calories with serious comorbidity and body mass index (BMI) of 50.0 to 59.9 in adult       Social History     Socioeconomic History    Marital status:    Tobacco Use    Smoking status: Never     Passive exposure: Never    Smokeless tobacco: Never   Vaping Use    Vaping status: Never Used   Substance and Sexual Activity    Alcohol use: No    Drug use: No    Sexual activity: Yes     Partners: Male     Birth control/protection: None       Allergies   Allergen Reactions    Aspirin Hives and Swelling       Current Outpatient Medications on File Prior to Visit   Medication Sig Dispense Refill    albuterol sulfate  (90 Base) MCG/ACT inhaler INHALE TWO PUFFS BY MOUTH EVERY 4 HOURS AS NEEDED FOR WHEEZING 18 g 1    Fluticasone-Salmeterol (ADVAIR/WIXELA) 250-50 MCG/ACT DISKUS Inhale 1 puff 2 (Two) Times a Day. Disp 3 inhalers with 3 ref 3 each 3    montelukast (SINGULAIR) 10 MG tablet TAKE ONE TABLET BY MOUTH ONCE NIGHTLY 90 tablet 1    Ozempic, 2 MG/DOSE, 8 MG/3ML solution pen-injector DIAL AND INJECT UNDER THE SKIN 2 MG WEEKLY 3 mL 5    rosuvastatin (Crestor) 5 MG tablet Take 1 tablet by mouth Daily. 90 tablet 3    vitamin D3 125 MCG (5000 UT) capsule capsule  "TAKE 1 CAPSULE BY MOUTH DAILY 90 capsule 1    [DISCONTINUED] naproxen (Naprosyn) 500 MG tablet Take 1 tablet by mouth 2 (Two) Times a Day With Meals. (Patient not taking: Reported on 10/28/2024) 30 tablet 0     No current facility-administered medications on file prior to visit.       Objective   Vitals:    10/28/24 1652   BP: 122/72   Pulse: 94   SpO2: 99%   Weight: 108 kg (239 lb)   Height: 152.4 cm (60\")     Body mass index is 46.68 kg/m².    Physical Exam  Vitals and nursing note reviewed.   Constitutional:       Appearance: She is well-developed.   HENT:      Head: Normocephalic and atraumatic.   Neck:      Thyroid: No thyromegaly.      Vascular: No JVD.   Cardiovascular:      Rate and Rhythm: Normal rate and regular rhythm.      Heart sounds: Normal heart sounds. No murmur heard.     No friction rub. No gallop.   Pulmonary:      Effort: Pulmonary effort is normal. No respiratory distress.      Breath sounds: Normal breath sounds. No wheezing or rales.   Abdominal:      General: Bowel sounds are normal. There is no distension.      Palpations: Abdomen is soft.      Tenderness: There is no abdominal tenderness. There is no guarding or rebound.   Musculoskeletal:      Cervical back: Neck supple.   Skin:     General: Skin is warm and dry.   Neurological:      Mental Status: She is alert.      Gait: Gait normal.   Psychiatric:         Behavior: Behavior normal.         Assessment & Plan   Diagnoses and all orders for this visit:    1. Type 2 diabetes mellitus without complication, without long-term current use of insulin (Primary)  -     Microalbumin / Creatinine Urine Ratio - Urine, Clean Catch  -     Lipid Panel  -     Comprehensive Metabolic Panel  -     Hemoglobin A1c  -     TSH    2. Entrapment of both ulnar nerves    3. Morbid obesity    4. Migraine with aura and without status migrainosus, not intractable  -     SUMAtriptan (IMITREX) 100 MG tablet; Take one tablet at onset of headache. May repeat dose one " time in 2 hours if headache not relieved.  Dispense: 10 tablet; Refill: 5    5. Dyslipidemia    -dm2 - continue medications, recheck labs  -HLD - continue statin, recheck lipids.  -will give imitrex prn headaches  -ulnar nerve entrapement condtineu weight loss and elbow sleeves as doing.                 -Follow up: 6 months and prn

## 2024-11-12 LAB
ALBUMIN SERPL-MCNC: 4 G/DL (ref 3.9–4.9)
ALBUMIN/CREAT UR: 2 MG/G CREAT (ref 0–29)
ALP SERPL-CCNC: 88 IU/L (ref 44–121)
ALT SERPL-CCNC: 20 IU/L (ref 0–32)
AST SERPL-CCNC: 17 IU/L (ref 0–40)
BILIRUB SERPL-MCNC: 0.3 MG/DL (ref 0–1.2)
BUN SERPL-MCNC: 13 MG/DL (ref 6–20)
BUN/CREAT SERPL: 15 (ref 9–23)
CALCIUM SERPL-MCNC: 9.1 MG/DL (ref 8.7–10.2)
CHLORIDE SERPL-SCNC: 104 MMOL/L (ref 96–106)
CHOLEST SERPL-MCNC: 121 MG/DL (ref 100–199)
CO2 SERPL-SCNC: 22 MMOL/L (ref 20–29)
CREAT SERPL-MCNC: 0.87 MG/DL (ref 0.57–1)
CREAT UR-MCNC: 235 MG/DL
EGFRCR SERPLBLD CKD-EPI 2021: 90 ML/MIN/1.73
GLOBULIN SER CALC-MCNC: 3 G/DL (ref 1.5–4.5)
GLUCOSE SERPL-MCNC: 72 MG/DL (ref 70–99)
HBA1C MFR BLD: 5.6 % (ref 4.8–5.6)
HDLC SERPL-MCNC: 35 MG/DL
LDLC SERPL CALC-MCNC: 71 MG/DL (ref 0–99)
MICROALBUMIN UR-MCNC: 4.3 UG/ML
POTASSIUM SERPL-SCNC: 4.4 MMOL/L (ref 3.5–5.2)
PROT SERPL-MCNC: 7 G/DL (ref 6–8.5)
SODIUM SERPL-SCNC: 139 MMOL/L (ref 134–144)
TRIGL SERPL-MCNC: 75 MG/DL (ref 0–149)
TSH SERPL DL<=0.005 MIU/L-ACNC: 2.86 UIU/ML (ref 0.45–4.5)
VLDLC SERPL CALC-MCNC: 15 MG/DL (ref 5–40)

## 2024-12-30 DIAGNOSIS — J30.1 SEASONAL ALLERGIC RHINITIS DUE TO POLLEN: ICD-10-CM

## 2024-12-30 RX ORDER — MONTELUKAST SODIUM 10 MG/1
TABLET ORAL
Qty: 90 TABLET | Refills: 1 | Status: SHIPPED | OUTPATIENT
Start: 2024-12-30

## 2025-01-29 DIAGNOSIS — E66.01 MORBID (SEVERE) OBESITY DUE TO EXCESS CALORIES: ICD-10-CM

## 2025-01-29 DIAGNOSIS — R73.03 PREDIABETES: ICD-10-CM

## 2025-01-29 DIAGNOSIS — E28.2 PCOS (POLYCYSTIC OVARIAN SYNDROME): ICD-10-CM

## 2025-01-29 RX ORDER — SEMAGLUTIDE 2.68 MG/ML
INJECTION, SOLUTION SUBCUTANEOUS
Qty: 3 ML | Refills: 5 | Status: SHIPPED | OUTPATIENT
Start: 2025-01-29

## 2025-04-08 RX ORDER — ROSUVASTATIN CALCIUM 5 MG/1
5 TABLET, COATED ORAL DAILY
Qty: 90 TABLET | Refills: 3 | Status: SHIPPED | OUTPATIENT
Start: 2025-04-08

## 2025-06-11 DIAGNOSIS — E55.9 VITAMIN D DEFICIENCY: ICD-10-CM

## 2025-06-11 DIAGNOSIS — R53.83 FATIGUE, UNSPECIFIED TYPE: ICD-10-CM

## 2025-06-11 DIAGNOSIS — R73.03 PREDIABETES: Primary | ICD-10-CM

## 2025-06-11 DIAGNOSIS — E78.5 DYSLIPIDEMIA: ICD-10-CM

## 2025-06-11 DIAGNOSIS — E78.5 HYPERLIPIDEMIA, UNSPECIFIED HYPERLIPIDEMIA TYPE: ICD-10-CM

## 2025-06-11 DIAGNOSIS — Z11.59 NEED FOR HEPATITIS C SCREENING TEST: ICD-10-CM

## 2025-06-13 DIAGNOSIS — E05.00 GRAVES DISEASE: Primary | ICD-10-CM

## 2025-06-13 DIAGNOSIS — M32.19 OTHER SYSTEMIC LUPUS ERYTHEMATOSUS WITH OTHER ORGAN INVOLVEMENT: ICD-10-CM

## 2025-06-19 ENCOUNTER — OFFICE VISIT (OUTPATIENT)
Dept: FAMILY MEDICINE CLINIC | Facility: CLINIC | Age: 35
End: 2025-06-19
Payer: COMMERCIAL

## 2025-06-19 VITALS
HEART RATE: 94 BPM | BODY MASS INDEX: 45.35 KG/M2 | WEIGHT: 231 LBS | DIASTOLIC BLOOD PRESSURE: 80 MMHG | OXYGEN SATURATION: 99 % | SYSTOLIC BLOOD PRESSURE: 122 MMHG | HEIGHT: 60 IN

## 2025-06-19 DIAGNOSIS — E11.9 ENCOUNTER FOR DIABETIC FOOT EXAM: ICD-10-CM

## 2025-06-19 DIAGNOSIS — Z00.00 WELLNESS EXAMINATION: Primary | ICD-10-CM

## 2025-06-19 DIAGNOSIS — E78.5 DYSLIPIDEMIA: ICD-10-CM

## 2025-06-19 DIAGNOSIS — E11.9 TYPE 2 DIABETES MELLITUS WITHOUT COMPLICATION, WITHOUT LONG-TERM CURRENT USE OF INSULIN: ICD-10-CM

## 2025-06-19 PROCEDURE — 99395 PREV VISIT EST AGE 18-39: CPT | Performed by: FAMILY MEDICINE

## 2025-06-19 NOTE — PROGRESS NOTES
"Tala Aaron is a 35 y.o. female. Presents today for   Chief Complaint   Patient presents with    Annual Exam    Diabetes    Hyperlipidemia         History of Present Illness  History of Present Illness  The patient is a 35-year-old female with type 2 diabetes and dyslipidemia, presenting for a wellness exam. She is on Ozempic for diabetes, which has kept it well controlled, and rosuvastatin for dyslipidemia. She is actively working on weight loss. Her annual labs were drawn prior to today's appointment and are pending.    Reports sumatriptan made dizzy;  however riboflavin and magnesium preventing and making less severe.    Review of Systems   Respiratory:  Negative for shortness of breath.    Cardiovascular:  Negative for chest pain and palpitations.   Neurological:  Positive for headaches.       Patient Active Problem List   Diagnosis    Mild intermittent asthma with status asthmaticus    Hypersomnia with sleep apnea    Class 3 severe obesity due to excess calories with serious comorbidity and body mass index (BMI) of 50.0 to 59.9 in adult       Social History     Socioeconomic History    Marital status:    Tobacco Use    Smoking status: Never     Passive exposure: Never    Smokeless tobacco: Never   Vaping Use    Vaping status: Never Used   Substance and Sexual Activity    Alcohol use: No    Drug use: No    Sexual activity: Yes     Partners: Male     Birth control/protection: None       Allergies   Allergen Reactions    Aspirin Hives and Swelling         Objective   Vitals:    06/19/25 1038   BP: 122/80   Pulse: 94   SpO2: 99%   Weight: 105 kg (231 lb)   Height: 152.4 cm (60\")     Body mass index is 45.11 kg/m².    Physical Exam  Vitals and nursing note reviewed.   Constitutional:       Appearance: She is well-developed.   HENT:      Head: Normocephalic and atraumatic.   Neck:      Thyroid: No thyromegaly.      Vascular: No JVD.   Cardiovascular:      Rate and Rhythm: Normal rate and regular " rhythm.      Heart sounds: Normal heart sounds. No murmur heard.     No friction rub. No gallop.   Pulmonary:      Effort: Pulmonary effort is normal. No respiratory distress.      Breath sounds: Normal breath sounds. No wheezing or rales.   Abdominal:      General: Bowel sounds are normal. There is no distension.      Palpations: Abdomen is soft.      Tenderness: There is no abdominal tenderness. There is no guarding or rebound.   Musculoskeletal:      Cervical back: Neck supple.   Feet:      Comments: Diabetic foot exam and monofilament completed, see scanned report.        Skin:     General: Skin is warm and dry.   Neurological:      Mental Status: She is alert.      Gait: Gait normal.   Psychiatric:         Behavior: Behavior normal.       Physical Exam      Results       Assessment & Plan   Diagnoses and all orders for this visit:    1. Wellness examination (Primary)    2. Type 2 diabetes mellitus without complication, without long-term current use of insulin    3. Dyslipidemia    4. Encounter for diabetic foot exam    Counseled on diet and exercise  Doing well with weight loss  Counseled on GLP1 use and protein intake  Counseled on migraines       Assessment & Plan            -Follow up: 6 months and prn     ________________________________________  Chris Locke DO, MS    Current Outpatient Medications on File Prior to Visit   Medication Sig Dispense Refill    albuterol sulfate  (90 Base) MCG/ACT inhaler INHALE TWO PUFFS BY MOUTH EVERY 4 HOURS AS NEEDED FOR WHEEZING 18 g 1    Fluticasone-Salmeterol (ADVAIR/WIXELA) 250-50 MCG/ACT DISKUS Inhale 1 puff 2 (Two) Times a Day. Disp 3 inhalers with 3 ref 3 each 3    montelukast (SINGULAIR) 10 MG tablet TAKE ONE TABLET BY MOUTH ONCE NIGHTLY 90 tablet 1    Ozempic, 2 MG/DOSE, 8 MG/3ML solution pen-injector DIAL AND INJECT UNDER THE SKIN 2 MG WEEKLY 3 mL 5    rosuvastatin (CRESTOR) 5 MG tablet TAKE 1 TABLET BY MOUTH DAILY 90 tablet 3    SUMAtriptan  (IMITREX) 100 MG tablet Take one tablet at onset of headache. May repeat dose one time in 2 hours if headache not relieved. 10 tablet 5    vitamin D3 125 MCG (5000 UT) capsule capsule TAKE 1 CAPSULE BY MOUTH DAILY 90 capsule 1     No current facility-administered medications on file prior to visit.

## 2025-06-23 ENCOUNTER — RESULTS FOLLOW-UP (OUTPATIENT)
Dept: FAMILY MEDICINE CLINIC | Facility: CLINIC | Age: 35
End: 2025-06-23
Payer: COMMERCIAL

## 2025-06-23 LAB
25(OH)D3+25(OH)D2 SERPL-MCNC: 57.8 NG/ML (ref 30–100)
ALBUMIN SERPL-MCNC: 4.1 G/DL (ref 3.9–4.9)
ALP SERPL-CCNC: 100 IU/L (ref 44–121)
ALT SERPL-CCNC: 28 IU/L (ref 0–32)
ANA SER QL: NEGATIVE
AST SERPL-CCNC: 19 IU/L (ref 0–40)
BILIRUB SERPL-MCNC: 0.3 MG/DL (ref 0–1.2)
BUN SERPL-MCNC: 13 MG/DL (ref 6–20)
BUN/CREAT SERPL: 14 (ref 9–23)
CALCIUM SERPL-MCNC: 9.4 MG/DL (ref 8.7–10.2)
CHLORIDE SERPL-SCNC: 105 MMOL/L (ref 96–106)
CHOLEST SERPL-MCNC: 106 MG/DL (ref 100–199)
CO2 SERPL-SCNC: 21 MMOL/L (ref 20–29)
CREAT SERPL-MCNC: 0.94 MG/DL (ref 0.57–1)
CRP SERPL-MCNC: 2 MG/L (ref 0–10)
EGFRCR SERPLBLD CKD-EPI 2021: 82 ML/MIN/1.73
ERYTHROCYTE [DISTWIDTH] IN BLOOD BY AUTOMATED COUNT: 13.5 % (ref 11.7–15.4)
ERYTHROCYTE [SEDIMENTATION RATE] IN BLOOD BY WESTERGREN METHOD: 34 MM/HR (ref 0–32)
GLOBULIN SER CALC-MCNC: 3.1 G/DL (ref 1.5–4.5)
GLUCOSE SERPL-MCNC: 87 MG/DL (ref 70–99)
HBA1C MFR BLD: 5.4 % (ref 4.8–5.6)
HCT VFR BLD AUTO: 42.5 % (ref 34–46.6)
HCV IGG SERPL QL IA: NON REACTIVE
HDLC SERPL-MCNC: 38 MG/DL
HGB BLD-MCNC: 13.2 G/DL (ref 11.1–15.9)
LDLC SERPL CALC-MCNC: 56 MG/DL (ref 0–99)
MCH RBC QN AUTO: 27.4 PG (ref 26.6–33)
MCHC RBC AUTO-ENTMCNC: 31.1 G/DL (ref 31.5–35.7)
MCV RBC AUTO: 88 FL (ref 79–97)
PLATELET # BLD AUTO: 299 X10E3/UL (ref 150–450)
POTASSIUM SERPL-SCNC: 4.5 MMOL/L (ref 3.5–5.2)
PROT SERPL-MCNC: 7.2 G/DL (ref 6–8.5)
RBC # BLD AUTO: 4.82 X10E6/UL (ref 3.77–5.28)
SODIUM SERPL-SCNC: 139 MMOL/L (ref 134–144)
T3FREE SERPL-MCNC: 2.7 PG/ML (ref 2–4.4)
T4 FREE SERPL-MCNC: 1.14 NG/DL (ref 0.82–1.77)
THYROGLOB AB SERPL-ACNC: 3.2 IU/ML (ref 0–0.9)
THYROPEROXIDASE AB SERPL-ACNC: 11 IU/ML (ref 0–34)
TRIGL SERPL-MCNC: 50 MG/DL (ref 0–149)
TSH RECEP AB SER-ACNC: <1.1 IU/L (ref 0–1.75)
TSH SERPL DL<=0.005 MIU/L-ACNC: 1.62 UIU/ML (ref 0.45–4.5)
VLDLC SERPL CALC-MCNC: 12 MG/DL (ref 5–40)
WBC # BLD AUTO: 11.2 X10E3/UL (ref 3.4–10.8)

## 2025-06-23 NOTE — PROGRESS NOTES
Mail results to patient  Diabetes is well controlled.  Cholesterol is well controlled.  1 thyroid antibody positive, but thyroid function normal;  Just need to monitor function for now.  1 inflammatory marker and wbc mildly elevated, suggesting inflammation.   Autoimmune marker negative.  Just need to monitor for now as nonspecific.   May improve with further weight loss as well.   Rest of labs normal or stable.

## 2025-06-23 NOTE — LETTER
Jose Alfredo Aaron  [address] [facility] [place] [address]    June 23, 2025     Dear Ms. Aaron:    Below are the results from your recent visit:  Diabetes is well controlled.  Cholesterol is well controlled.  1 thyroid antibody positive, but thyroid function normal;  Just need to monitor function for now.  1 inflammatory marker and wbc mildly elevated, suggesting inflammation.   Autoimmune marker negative.  Just need to monitor for now as nonspecific.   May improve with further weight loss as well.   Rest of labs normal or stable.      Resulted Orders   T3, Free   Result Value Ref Range    T3, Free 2.7 2.0 - 4.4 pg/mL   T4, Free   Result Value Ref Range    Free T4 1.14 0.82 - 1.77 ng/dL   Thyroid Antibodies   Result Value Ref Range    Thyroid Peroxidase Antibody 11 0 - 34 IU/mL    Thyroglobulin Ab 3.2 (H) 0.0 - 0.9 IU/mL      Comment:      Thyroglobulin Antibody measured by [employer] Methodology  It should be noted that the presence of thyroglobulin antibodies  may not be pathogenic nor diagnostic, especially at very low  levels. The assay  has found that four percent of  individuals without evidence of thyroid disease or autoimmunity  will have positive TgAb levels up to 4 IU/mL.     Thyrotropin Receptor Antibody   Result Value Ref Range    Thyrotropin Receptor Antibody <1.10 0.00 - 1.75 IU/L   TSH   Result Value Ref Range    TSH 1.620 0.450 - 4.500 uIU/mL   MINISTERIO With / DsDNA, RNP, Sjogrens A / B, Chow   Result Value Ref Range    MINISTERIO Direct Negative Negative   C-reactive Protein   Result Value Ref Range    C-Reactive Protein 2 0 - 10 mg/L   Sedimentation Rate   Result Value Ref Range    Sed Rate 34 (H) 0 - 32 mm/hr   Comprehensive Metabolic Panel   Result Value Ref Range    Glucose 87 70 - 99 mg/dL    BUN 13 6 - 20 mg/dL    Creatinine 0.94 0.57 - 1.00 mg/dL    EGFR Result 82 >59 mL/min/1.73    BUN/Creatinine Ratio 14 9 - 23    Sodium 139 134 - 144 mmol/L    Potassium 4.5 3.5 - 5.2 mmol/L    Chloride 105  96 - 106 mmol/L    Total CO2 21 20 - 29 mmol/L    Calcium 9.4 8.7 - 10.2 mg/dL    Total Protein 7.2 6.0 - 8.5 g/dL    Albumin 4.1 3.9 - 4.9 g/dL    Globulin 3.1 1.5 - 4.5 g/dL    Total Bilirubin 0.3 0.0 - 1.2 mg/dL    Alkaline Phosphatase 100 44 - 121 IU/L    AST (SGOT) 19 0 - 40 IU/L    ALT (SGPT) 28 0 - 32 IU/L   CBC (No Diff)   Result Value Ref Range    WBC 11.2 (H) 3.4 - 10.8 x10E3/uL    RBC 4.82 3.77 - 5.28 x10E6/uL    Hemoglobin 13.2 11.1 - 15.9 g/dL    Hematocrit 42.5 34.0 - 46.6 %    MCV 88 79 - 97 fL    MCH 27.4 26.6 - 33.0 pg    MCHC 31.1 (L) 31.5 - 35.7 g/dL    RDW 13.5 11.7 - 15.4 %    Platelets 299 150 - 450 x10E3/uL   Lipid Panel   Result Value Ref Range    Total Cholesterol 106 100 - 199 mg/dL    Triglycerides 50 0 - 149 mg/dL    HDL Cholesterol 38 (L) >39 mg/dL    VLDL Cholesterol Fei 12 5 - 40 mg/dL    LDL Chol Calc (NIH) 56 0 - 99 mg/dL   Hemoglobin A1c   Result Value Ref Range    Hemoglobin A1C 5.4 4.8 - 5.6 %      Comment:               Prediabetes: 5.7 - 6.4           Diabetes: >6.4           Glycemic control for adults with diabetes: <7.0     Vitamin D,25-Hydroxy   Result Value Ref Range    25 Hydroxy, Vitamin D 57.8 30.0 - 100.0 ng/mL      Comment:      Vitamin D deficiency has been defined by the [employer]  [employer] and an Endocrine Society practice guideline as a  level of serum 25-OH vitamin D less than 20 ng/mL (1,2).  The Endocrine Society went on to further define vitamin D  insufficiency as a level between 21 and 29 ng/mL (2).  1. IOM ([employer]). 2010. Dietary reference     intakes for calcium and D. [place]: The     National Academies Press.  2. Jarett MF, Aileen MACIEL, Cristi CORREA, et al.     Evaluation, treatment, and prevention of vitamin D     deficiency: an Endocrine Society clinical practice     guideline. JCEM. 2011 Jul; 96(7):1911-30.     Hepatitis C Antibody   Result Value Ref Range    Hep C Virus Ab Non Reactive Non Reactive      Comment:      HCV  antibody alone does not differentiate between previously  resolved infection and active infection. Equivocal and Reactive  HCV antibody results should be followed up with an HCV RNA test  to support the diagnosis of active HCV infection.             If you have any questions or concerns, please don't hesitate to call.         Sincerely,        Chris Locke, DO

## 2025-06-25 DIAGNOSIS — J30.1 SEASONAL ALLERGIC RHINITIS DUE TO POLLEN: ICD-10-CM

## 2025-06-25 RX ORDER — MONTELUKAST SODIUM 10 MG/1
10 TABLET ORAL NIGHTLY
Qty: 90 TABLET | Refills: 1 | Status: SHIPPED | OUTPATIENT
Start: 2025-06-25

## 2025-07-24 DIAGNOSIS — R73.03 PREDIABETES: ICD-10-CM

## 2025-07-24 DIAGNOSIS — E66.01 MORBID (SEVERE) OBESITY DUE TO EXCESS CALORIES: ICD-10-CM

## 2025-07-24 DIAGNOSIS — E28.2 PCOS (POLYCYSTIC OVARIAN SYNDROME): ICD-10-CM

## 2025-07-24 RX ORDER — SEMAGLUTIDE 2.68 MG/ML
INJECTION, SOLUTION SUBCUTANEOUS
Qty: 3 ML | Refills: 5 | Status: SHIPPED | OUTPATIENT
Start: 2025-07-24